# Patient Record
Sex: FEMALE | Race: WHITE | Employment: FULL TIME | URBAN - METROPOLITAN AREA
[De-identification: names, ages, dates, MRNs, and addresses within clinical notes are randomized per-mention and may not be internally consistent; named-entity substitution may affect disease eponyms.]

---

## 2020-12-08 PROBLEM — E11.9 TYPE 2 DIABETES MELLITUS WITHOUT COMPLICATION, WITHOUT LONG-TERM CURRENT USE OF INSULIN (HCC): Status: ACTIVE | Noted: 2020-12-08

## 2020-12-08 PROBLEM — I10 ESSENTIAL HYPERTENSION: Status: ACTIVE | Noted: 2020-12-08

## 2020-12-08 PROBLEM — K21.9 GASTROESOPHAGEAL REFLUX DISEASE WITHOUT ESOPHAGITIS: Status: ACTIVE | Noted: 2020-12-08

## 2022-12-06 ENCOUNTER — TELEPHONE (OUTPATIENT)
Dept: ORTHOPEDIC SURGERY | Age: 52
End: 2022-12-06

## 2022-12-06 NOTE — TELEPHONE ENCOUNTER
Patient is asking for a return call regarding her lab results from 11/28. Phone number on file has been verified. Thank you.

## 2022-12-14 NOTE — H&P (VIEW-ONLY)
History and Physical Service   [place] [address]    HISTORY AND PHYSICAL EXAMINATION            Date of Evaluation: 12/14/2022  Patient name:  Elmer Hui  MRN:   [id number]  YOB: 1970  PCP:    Sumeet Helton MD    History Obtained From:     Patient, medical records    History of Present Illness: This is Elmer Hui a 50 y.o. female who presents for a pre-admission testing appointment for an upcoming [address] & NEPHEW FOR SPACER by Sonya Funez DO scheduled on 1/03/2022 at 1350 due to Presence of left artificial knee joint [Z96.652]. The patient's chief complaint is 3/10 left knee pain, swelling , and burning which has progressively worsened over the past 3 months. Knee pain is aggravated by walking and is minimally relieved with ice and elevation. Prior treatment includes prior left total knee in 12/08/2020, as well as physical therapy and injections since initial surgery in 2020. Denies recent falls and injuries. She last saw Dr. Erma Whitman in office on 11/20/2022. Sleep apnea questionnaire  1) Do you snore loudly? Yes  2) Do you often feel tired, fatigued, or sleepy? No  3) Has anyone observed you stop breathing or choking/gasping during your sleep? No  4) Do you have hypertension? Yes  5) BMI >35 kg/m2? Yes  6) Age > 48? Yes  7) Pt is a male? No      Functional Capacity:  1) Pt is not able to walk 2 city blocks on level ground due to knee pain  2) Pt is not able to climb 2 flights of stairs due to knee pain  3) Pt is not able to walk up a hill for 1-2 city blocks due to knee pain    Prior to this, patient was very active walking miles at a time with no issue.     Past Medical History:     Past Medical History:   Diagnosis Date    Arthritis     Bladder spasms     Depression     Diabetes mellitus ([facility] [address]     pre-diabetic    Fracture left humerus/ Fall    GERD (gastroesophageal reflux disease)     Hypertension     Kidney stones     Pain     left humerus    Personal history of other medical treatment     Pt. denies ever being Diabetic, states she was put on Amaryl due to high A1C before a previous surgery.  Pt. states PCP will be taking her off this med at next visit    Seasonal allergies     Snores     denies apnea    Under care of team     urology/ Dr. Sheikh Self last seen 6-30-21    Urinary urgency     UTI (urinary tract infection)     frequent    Wears glasses     Wellness examination     PCP Carmella Palencia MD/arley/ last seen 7-9-21        Past Surgical History:     Past Surgical History:   Procedure Laterality Date    COLONOSCOPY      FOREARM SURGERY Left 10/1/2021    OPEN REDUCTION INTERNAL FIXATION PROXIMAL HUMERUS FRACTURE, SYNTHES, C-ARM performed by Anthony Mora DO at [facility] Left 10/01/2021    ORIF     HYSTERECTOMY (CERVIX STATUS UNKNOWN)  2016    KNEE ARTHROSCOPY Left 2016    KNEE ARTHROSCOPY Left 2/14/2020    LEFT KNEE ARTHROSCOPY WITH DEBRIDEMENT REMOVAL OF LOOSE BODY performed by Jeannine Price DO at [facility]  2005    SHOULDER ARTHROSCOPY Right 2010    SHOULDER SURGERY Left 10/01/2021    SHOULDER SURGERY Left 04/19/2022     LEFT SHOULDER IMPLANT REMOVAL AND MANIPULATION UNDER ANESTHESIA WITH SYNTHES SCREWDRIVERS AND PRE-OP INTERSCALENE BLOCK NO EXPAREL (Left Shoulder)    SHOULDER SURGERY Left 4/19/2022    LEFT SHOULDER IMPLANT REMOVAL AND MANIPULATION UNDER ANESTHESIA WITH SYNTHES SCREWDRIVERS AND PRE-OP INTERSCALENE BLOCK NO EXPAREL performed by Julio Cesar Fox MD at [address] [facility] [address]  12/2020    TOTAL KNEE ARTHROPLASTY Left 12/8/2020    LEFT KNEE TOTAL ARTHROPLASTY performed by Jeannine Price DO at [address] [facility]        Medications Prior to Admission:     Prior to Admission medications    Medication Sig Start Date End Date Taking? Authorizing Provider   calcium carbonate (OSCAL) 500 MG TABS tablet Take 500 mg by mouth daily With Vitamin D  Patient not taking: Reported on 12/14/2022    Historical Provider, MD   Multiple Vitamins-Minerals (HAIR SKIN AND NAILS FORMULA PO) Take 1 tablet by mouth daily    Historical Provider, MD   ibuprofen (ADVIL;MOTRIN) 800 MG tablet Take 800 mg by mouth every 6 hours as needed for Pain  Patient not taking: Reported on 12/14/2022    Historical Provider, MD   Mometasone Furoate (NASONEX NA) by Nasal route daily  Patient not taking: Reported on 12/14/2022    Historical Provider, MD   Turmeric (QC TUMERIC COMPLEX PO) Take by mouth daily  Patient not taking: Reported on 12/14/2022    Historical Provider, MD   polycarbophil (FIBERCON) 625 MG tablet Take 625 mg by mouth daily Unsure of dose  Patient not taking: Reported on 12/14/2022    Historical Provider, MD   citalopram (CELEXA) 40 MG tablet Take 40 mg by mouth daily 2/23/22   Historical Provider, MD   NONFORMULARY Take 1 tablet by mouth daily Cranberry extract  Patient not taking: Reported on 12/14/2022    Historical Provider, MD   glimepiride (AMARYL) 1 MG tablet Take 0.5 mg by mouth daily Takes 1/2 of a 1 mg tablet 11/23/20   Historical Provider, MD   loratadine (CLARITIN) 10 MG capsule Take 10 mg by mouth daily  Patient not taking: Reported on 12/14/2022    Historical Provider, MD   Ascorbic Acid (VITAMIN C PO) Take 250 mg by mouth nightly    Historical Provider, MD   omeprazole (PRILOSEC) 20 MG delayed release capsule Take 20 mg by mouth daily as needed     Historical Provider, MD   metoprolol tartrate (LOPRESSOR) 25 MG tablet Take 25 mg by mouth daily  11/4/19   Historical Provider, MD        Allergies:     Meperidine hcl and Sulfa antibiotics    Social History:     Tobacco:    reports that she has never smoked. She has never used smokeless tobacco.  Alcohol:      reports current alcohol use.   Drug Use:  reports no history of drug use.    Family History:     Family History   Problem Relation Age of Onset    No Known Problems Mother     No Known Problems Father        Review of Systems:     Positive and Negative as described in HPI. CONSTITUTIONAL: Negative for fevers, chills, sweats, fatigue, and weight loss. HEENT: Wears glasses Negative for hearing changes, rhinorrhea, and throat pain. RESPIRATORY: Snores. Negative for shortness of breath, cough, congestion, and wheezing. CARDIOVASCULAR: Hypertension Negative for chest pain, blood clot, irregular heartbeat, and palpitations. GASTROINTESTINAL: GERD. Negative for nausea, vomiting, diarrhea, constipation, change in bowel habits, and abdominal pain. GENITOURINARY: Urinary urgency, history of kidney stones, bladder spasms- follow with urology Dr. Mary Pradhan- next appointment second week of January. Total hysterectomy Negative for difficulty of urination, burning with urination, and frequency. INTEGUMENT: Negative for rash, skin lesions, and easy bruising. Instructed pt to call Dr. Erma Whitman as soon as possible if a rash or wound develops prior to surgery. Pt voiced understanding. HEMATOLOGIC/LYMPHATIC:  Negative for swelling/edema. ALLERGIC/IMMUNOLOGIC:  Negative for urticaria and itching. ENDOCRINE: Prediabetes. Negative for increase in thirst, increase in urination, and heat or cold intolerance. MUSCULOSKELETAL: See HPI. NEUROLOGICAL: Negative for headaches, dizziness, lightheadedness, numbness, and tingling extremities. BEHAVIOR/PSYCH: Depression. Negative for anxiety. Physical Exam:   Ht 5' 5\" (1.651 m)   Wt 241 lb (109.3 kg)   BMI 40.10 kg/m²   No LMP recorded. Patient has had a hysterectomy. No obstetric history on file. No results for input(s): POCGLU in the last 72 hours. General Appearance:  Alert, well appearing, and in no acute distress. Mental status: Oriented to person, place, and time. Head:  Normocephalic and atraumatic.   Eye: reading glasses No icterus, redness, pupils equal and reactive, extraocular eye movements intact, and conjunctiva clear. Ear:  Hearing grossly intact. Nose:  No drainage noted. Mouth:  Mucous membranes moist.  Neck:  Supple and no carotid bruits noted. Lungs: Bilateral equal air entry, clear to auscultation, no wheezing, rales or rhonchi, and normal effort. Cardiovascular: Normal rate, regular rhythm, no murmur, gallop, or rub. Abdomen: Morbidly obese, Soft, non-tender, non-distended, and active bowel sounds. Neurologic: Normal speech and cranial nerves II through XII grossly intact. Strength 5/5 bilaterally. Skin: No gross lesions, rashes, bruising, or bleeding on exposed skin area. Extremities:  Posterior tibial pulses 2+ bilaterally. No pedal edema. No calf tenderness with palpation. Psych: Normal affect.      Investigations:      Laboratory Testing:  Recent Results (from the past 24 hour(s))   EKG 12 Lead    Collection Time: 12/14/22  8:01 AM   Result Value Ref Range    Ventricular Rate 74 BPM    Atrial Rate 74 BPM    P-R Interval 140 ms    QRS Duration 88 ms    Q-T Interval 388 ms    QTc Calculation (Bazett) 430 ms    P Axis 26 degrees    R Axis 12 degrees    T Axis 23 degrees   CBC    Collection Time: 12/14/22  8:08 AM   Result Value Ref Range    WBC 10.5 3.5 - 11.3 k/uL    RBC 4.91 3.95 - 5.11 m/uL    Hemoglobin 13.4 11.9 - 15.1 g/dL    Hematocrit 43.7 36.3 - 47.1 %    MCV 89.0 82.6 - 102.9 fL    MCH 27.3 25.2 - 33.5 pg    MCHC 30.7 28.4 - 34.8 g/dL    RDW 12.9 11.8 - 14.4 %    Platelets 518 542 - 853 k/uL    MPV 10.3 8.1 - 13.5 fL    NRBC Automated 0.0 0.0 per 100 WBC   Comprehensive Metabolic Panel    Collection Time: 12/14/22  8:08 AM   Result Value Ref Range    Glucose 148 (H) 70 - 99 mg/dL    BUN 16 6 - 20 mg/dL    Creatinine 0.59 0.50 - 0.90 mg/dL    Est, Glom Filt Rate >60 >60 mL/min/1.73m2    Bun/Cre Ratio 27 (H) 9 - 20    Calcium 9.5 8.6 - 10.4 mg/dL    Sodium 139 135 - 144 mmol/L    Potassium 4.1 3.7 - 5.3 mmol/L Chloride 100 98 - 107 mmol/L    CO2 27 20 - 31 mmol/L    Anion Gap 12 9 - 17 mmol/L    Alkaline Phosphatase 104 35 - 104 U/L    ALT 16 5 - 33 U/L    AST 16 <32 U/L    Total Bilirubin 0.4 0.3 - 1.2 mg/dL    Total Protein 7.5 6.4 - 8.3 g/dL    Albumin 4.3 3.5 - 5.2 g/dL       Recent Labs     22  0808   HGB 13.4   HCT 43.7   WBC 10.5   MCV 89.0      K 4.1      CO2 27   BUN 16   CREATININE 0.59   GLUCOSE 148*   AST 16   ALT 16   LABALBU 4.3       No results for input(s): COVID19 in the last 720 hours. *Please note that labs listed above are the most recent lab values available in EPIC at the time of the visit and additional labs may have been drawn or resulted since that time. Imaging/Diagnostics:      No results found. EK2022. See Epic. Diagnosis:      1. Presence of left artificial knee joint [Z96.652]. Plans:     1.  LEFT KNEE TOTAL ARTHROPLASTY REVISION   WITH POSSIBLE CEMENT SPACER    WITH FROZEN SECTION      [facility]      Santy JAVI Betancourt - CNP  2022  8:44 AM

## 2022-12-30 ENCOUNTER — ANESTHESIA EVENT (OUTPATIENT)
Dept: OPERATING ROOM | Age: 52
DRG: 467 | End: 2022-12-30
Payer: COMMERCIAL

## 2023-01-03 ENCOUNTER — APPOINTMENT (OUTPATIENT)
Dept: GENERAL RADIOLOGY | Age: 53
DRG: 467 | End: 2023-01-03
Attending: ORTHOPAEDIC SURGERY
Payer: COMMERCIAL

## 2023-01-03 ENCOUNTER — HOSPITAL ENCOUNTER (INPATIENT)
Age: 53
LOS: 1 days | Discharge: HOME | DRG: 467 | End: 2023-01-04
Attending: ORTHOPAEDIC SURGERY | Admitting: ORTHOPAEDIC SURGERY
Payer: COMMERCIAL

## 2023-01-03 ENCOUNTER — ANESTHESIA (OUTPATIENT)
Dept: OPERATING ROOM | Age: 53
DRG: 467 | End: 2023-01-03
Payer: COMMERCIAL

## 2023-01-03 DIAGNOSIS — Z96.652 PRESENCE OF LEFT ARTIFICIAL KNEE JOINT: ICD-10-CM

## 2023-01-03 DIAGNOSIS — G89.18 POST-OP PAIN: Primary | ICD-10-CM

## 2023-01-03 LAB
GLUCOSE BLD-MCNC: 129 MG/DL (ref 65–105)
GLUCOSE BLD-MCNC: 241 MG/DL (ref 65–105)

## 2023-01-03 PROCEDURE — 7100000001 HC PACU RECOVERY - ADDTL 15 MIN: Performed by: ORTHOPAEDIC SURGERY

## 2023-01-03 PROCEDURE — 6360000002 HC RX W HCPCS

## 2023-01-03 PROCEDURE — 2580000003 HC RX 258: Performed by: ORTHOPAEDIC SURGERY

## 2023-01-03 PROCEDURE — 6370000000 HC RX 637 (ALT 250 FOR IP): Performed by: ANESTHESIOLOGY

## 2023-01-03 PROCEDURE — 88305 TISSUE EXAM BY PATHOLOGIST: CPT

## 2023-01-03 PROCEDURE — 3700000000 HC ANESTHESIA ATTENDED CARE: Performed by: ORTHOPAEDIC SURGERY

## 2023-01-03 PROCEDURE — 6360000002 HC RX W HCPCS: Performed by: ANESTHESIOLOGY

## 2023-01-03 PROCEDURE — 73562 X-RAY EXAM OF KNEE 3: CPT

## 2023-01-03 PROCEDURE — 2709999900 HC NON-CHARGEABLE SUPPLY: Performed by: ORTHOPAEDIC SURGERY

## 2023-01-03 PROCEDURE — 82947 ASSAY GLUCOSE BLOOD QUANT: CPT

## 2023-01-03 PROCEDURE — 2580000003 HC RX 258: Performed by: ANESTHESIOLOGY

## 2023-01-03 PROCEDURE — 87075 CULTR BACTERIA EXCEPT BLOOD: CPT

## 2023-01-03 PROCEDURE — 6360000002 HC RX W HCPCS: Performed by: SPECIALIST

## 2023-01-03 PROCEDURE — 7100000000 HC PACU RECOVERY - FIRST 15 MIN: Performed by: ORTHOPAEDIC SURGERY

## 2023-01-03 PROCEDURE — 3600000005 HC SURGERY LEVEL 5 BASE: Performed by: ORTHOPAEDIC SURGERY

## 2023-01-03 PROCEDURE — 87102 FUNGUS ISOLATION CULTURE: CPT

## 2023-01-03 PROCEDURE — 3700000001 HC ADD 15 MINUTES (ANESTHESIA): Performed by: ORTHOPAEDIC SURGERY

## 2023-01-03 PROCEDURE — 6360000002 HC RX W HCPCS: Performed by: ORTHOPAEDIC SURGERY

## 2023-01-03 PROCEDURE — 0SPD0JZ REMOVAL OF SYNTHETIC SUBSTITUTE FROM LEFT KNEE JOINT, OPEN APPROACH: ICD-10-PCS | Performed by: ORTHOPAEDIC SURGERY

## 2023-01-03 PROCEDURE — C9290 INJ, BUPIVACAINE LIPOSOME: HCPCS | Performed by: ANESTHESIOLOGY

## 2023-01-03 PROCEDURE — 64447 NJX AA&/STRD FEMORAL NRV IMG: CPT | Performed by: ANESTHESIOLOGY

## 2023-01-03 PROCEDURE — 6370000000 HC RX 637 (ALT 250 FOR IP)

## 2023-01-03 PROCEDURE — 87070 CULTURE OTHR SPECIMN AEROBIC: CPT

## 2023-01-03 PROCEDURE — 88331 PATH CONSLTJ SURG 1 BLK 1SPC: CPT

## 2023-01-03 PROCEDURE — 3600000015 HC SURGERY LEVEL 5 ADDTL 15MIN: Performed by: ORTHOPAEDIC SURGERY

## 2023-01-03 PROCEDURE — 87205 SMEAR GRAM STAIN: CPT

## 2023-01-03 PROCEDURE — 2500000003 HC RX 250 WO HCPCS: Performed by: ANESTHESIOLOGY

## 2023-01-03 PROCEDURE — C1713 ANCHOR/SCREW BN/BN,TIS/BN: HCPCS | Performed by: ORTHOPAEDIC SURGERY

## 2023-01-03 PROCEDURE — 2720000010 HC SURG SUPPLY STERILE: Performed by: ORTHOPAEDIC SURGERY

## 2023-01-03 PROCEDURE — 27487 REVISE/REPLACE KNEE JOINT: CPT | Performed by: ORTHOPAEDIC SURGERY

## 2023-01-03 PROCEDURE — C1776 JOINT DEVICE (IMPLANTABLE): HCPCS | Performed by: ORTHOPAEDIC SURGERY

## 2023-01-03 PROCEDURE — 2500000003 HC RX 250 WO HCPCS: Performed by: SPECIALIST

## 2023-01-03 PROCEDURE — 2580000003 HC RX 258

## 2023-01-03 PROCEDURE — 0SRD0J9 REPLACEMENT OF LEFT KNEE JOINT WITH SYNTHETIC SUBSTITUTE, CEMENTED, OPEN APPROACH: ICD-10-PCS | Performed by: ORTHOPAEDIC SURGERY

## 2023-01-03 PROCEDURE — 87176 TISSUE HOMOGENIZATION CULTR: CPT

## 2023-01-03 DEVICE — IMPLANTABLE DEVICE: Type: IMPLANTABLE DEVICE | Site: KNEE | Status: FUNCTIONAL

## 2023-01-03 DEVICE — CEMENT BNE 40GM HI VISC RADPQ FOR REV SURG: Type: IMPLANTABLE DEVICE | Site: KNEE | Status: FUNCTIONAL

## 2023-01-03 RX ORDER — GABAPENTIN 300 MG/1
300 CAPSULE ORAL ONCE
Status: COMPLETED | OUTPATIENT
Start: 2023-01-03 | End: 2023-01-03

## 2023-01-03 RX ORDER — SODIUM CHLORIDE 0.9 % (FLUSH) 0.9 %
5-40 SYRINGE (ML) INJECTION PRN
Status: DISCONTINUED | OUTPATIENT
Start: 2023-01-03 | End: 2023-01-03 | Stop reason: HOSPADM

## 2023-01-03 RX ORDER — HYDROMORPHONE HYDROCHLORIDE 1 MG/ML
0.5 INJECTION, SOLUTION INTRAMUSCULAR; INTRAVENOUS; SUBCUTANEOUS EVERY 5 MIN PRN
Status: DISCONTINUED | OUTPATIENT
Start: 2023-01-03 | End: 2023-01-03 | Stop reason: HOSPADM

## 2023-01-03 RX ORDER — FENTANYL CITRATE 50 UG/ML
INJECTION, SOLUTION INTRAMUSCULAR; INTRAVENOUS PRN
Status: DISCONTINUED | OUTPATIENT
Start: 2023-01-03 | End: 2023-01-03 | Stop reason: SDUPTHER

## 2023-01-03 RX ORDER — ONDANSETRON 2 MG/ML
4 INJECTION INTRAMUSCULAR; INTRAVENOUS EVERY 6 HOURS PRN
Status: DISCONTINUED | OUTPATIENT
Start: 2023-01-03 | End: 2023-01-04 | Stop reason: HOSPADM

## 2023-01-03 RX ORDER — ACETAMINOPHEN 500 MG
1000 TABLET ORAL ONCE
Status: COMPLETED | OUTPATIENT
Start: 2023-01-03 | End: 2023-01-03

## 2023-01-03 RX ORDER — ENOXAPARIN SODIUM 100 MG/ML
40 INJECTION SUBCUTANEOUS DAILY
Status: DISCONTINUED | OUTPATIENT
Start: 2023-01-03 | End: 2023-01-03 | Stop reason: DRUGHIGH

## 2023-01-03 RX ORDER — LIDOCAINE HYDROCHLORIDE 20 MG/ML
INJECTION, SOLUTION EPIDURAL; INFILTRATION; INTRACAUDAL; PERINEURAL PRN
Status: DISCONTINUED | OUTPATIENT
Start: 2023-01-03 | End: 2023-01-03 | Stop reason: SDUPTHER

## 2023-01-03 RX ORDER — SODIUM CHLORIDE 9 MG/ML
INJECTION, SOLUTION INTRAVENOUS PRN
Status: DISCONTINUED | OUTPATIENT
Start: 2023-01-03 | End: 2023-01-03 | Stop reason: HOSPADM

## 2023-01-03 RX ORDER — SODIUM CHLORIDE 9 MG/ML
INJECTION, SOLUTION INTRAVENOUS PRN
Status: DISCONTINUED | OUTPATIENT
Start: 2023-01-03 | End: 2023-01-04 | Stop reason: HOSPADM

## 2023-01-03 RX ORDER — ONDANSETRON 2 MG/ML
4 INJECTION INTRAMUSCULAR; INTRAVENOUS
Status: DISCONTINUED | OUTPATIENT
Start: 2023-01-03 | End: 2023-01-03 | Stop reason: HOSPADM

## 2023-01-03 RX ORDER — ASPIRIN 81 MG/1
81 TABLET ORAL 2 TIMES DAILY
Qty: 84 TABLET | Refills: 0 | Status: SHIPPED | OUTPATIENT
Start: 2023-01-03 | End: 2023-02-14

## 2023-01-03 RX ORDER — SODIUM CHLORIDE, SODIUM LACTATE, POTASSIUM CHLORIDE, CALCIUM CHLORIDE 600; 310; 30; 20 MG/100ML; MG/100ML; MG/100ML; MG/100ML
INJECTION, SOLUTION INTRAVENOUS CONTINUOUS
Status: DISCONTINUED | OUTPATIENT
Start: 2023-01-03 | End: 2023-01-04 | Stop reason: HOSPADM

## 2023-01-03 RX ORDER — LIDOCAINE HYDROCHLORIDE 10 MG/ML
INJECTION, SOLUTION INFILTRATION; PERINEURAL PRN
Status: DISCONTINUED | OUTPATIENT
Start: 2023-01-03 | End: 2023-01-03 | Stop reason: SDUPTHER

## 2023-01-03 RX ORDER — PROPOFOL 10 MG/ML
INJECTION, EMULSION INTRAVENOUS CONTINUOUS PRN
Status: DISCONTINUED | OUTPATIENT
Start: 2023-01-03 | End: 2023-01-03 | Stop reason: SDUPTHER

## 2023-01-03 RX ORDER — ACETAMINOPHEN 500 MG
1000 TABLET ORAL EVERY 6 HOURS
Status: DISCONTINUED | OUTPATIENT
Start: 2023-01-03 | End: 2023-01-04 | Stop reason: HOSPADM

## 2023-01-03 RX ORDER — OXYCODONE HYDROCHLORIDE AND ACETAMINOPHEN 5; 325 MG/1; MG/1
1-2 TABLET ORAL EVERY 6 HOURS PRN
Qty: 56 TABLET | Refills: 0 | Status: SHIPPED | OUTPATIENT
Start: 2023-01-03 | End: 2023-01-10

## 2023-01-03 RX ORDER — ACETAMINOPHEN 325 MG/1
650 TABLET ORAL EVERY 6 HOURS
Status: DISCONTINUED | OUTPATIENT
Start: 2023-01-03 | End: 2023-01-03 | Stop reason: DRUGHIGH

## 2023-01-03 RX ORDER — SODIUM CHLORIDE 0.9 % (FLUSH) 0.9 %
5-40 SYRINGE (ML) INJECTION EVERY 12 HOURS SCHEDULED
Status: DISCONTINUED | OUTPATIENT
Start: 2023-01-03 | End: 2023-01-03 | Stop reason: HOSPADM

## 2023-01-03 RX ORDER — OXYCODONE HYDROCHLORIDE 5 MG/1
10 TABLET ORAL EVERY 4 HOURS PRN
Status: DISCONTINUED | OUTPATIENT
Start: 2023-01-03 | End: 2023-01-04 | Stop reason: HOSPADM

## 2023-01-03 RX ORDER — KETOROLAC TROMETHAMINE 15 MG/ML
15 INJECTION, SOLUTION INTRAMUSCULAR; INTRAVENOUS EVERY 6 HOURS PRN
Status: DISCONTINUED | OUTPATIENT
Start: 2023-01-03 | End: 2023-01-04 | Stop reason: HOSPADM

## 2023-01-03 RX ORDER — FENTANYL CITRATE 50 UG/ML
25 INJECTION, SOLUTION INTRAMUSCULAR; INTRAVENOUS EVERY 5 MIN PRN
Status: COMPLETED | OUTPATIENT
Start: 2023-01-03 | End: 2023-01-03

## 2023-01-03 RX ORDER — SODIUM CHLORIDE 0.9 % (FLUSH) 0.9 %
5-40 SYRINGE (ML) INJECTION PRN
Status: DISCONTINUED | OUTPATIENT
Start: 2023-01-03 | End: 2023-01-04 | Stop reason: HOSPADM

## 2023-01-03 RX ORDER — ENOXAPARIN SODIUM 100 MG/ML
30 INJECTION SUBCUTANEOUS 2 TIMES DAILY
Status: DISCONTINUED | OUTPATIENT
Start: 2023-01-03 | End: 2023-01-04 | Stop reason: HOSPADM

## 2023-01-03 RX ORDER — CELECOXIB 200 MG/1
200 CAPSULE ORAL 2 TIMES DAILY
Status: DISCONTINUED | OUTPATIENT
Start: 2023-01-03 | End: 2023-01-04 | Stop reason: HOSPADM

## 2023-01-03 RX ORDER — ONDANSETRON 4 MG/1
4 TABLET, ORALLY DISINTEGRATING ORAL EVERY 8 HOURS PRN
Status: DISCONTINUED | OUTPATIENT
Start: 2023-01-03 | End: 2023-01-04 | Stop reason: HOSPADM

## 2023-01-03 RX ORDER — VANCOMYCIN HYDROCHLORIDE 1 G/20ML
INJECTION, POWDER, LYOPHILIZED, FOR SOLUTION INTRAVENOUS
Status: DISPENSED
Start: 2023-01-03 | End: 2023-01-04

## 2023-01-03 RX ORDER — MIDAZOLAM HYDROCHLORIDE 1 MG/ML
2 INJECTION INTRAMUSCULAR; INTRAVENOUS ONCE
Status: COMPLETED | OUTPATIENT
Start: 2023-01-03 | End: 2023-01-03

## 2023-01-03 RX ORDER — DIAZEPAM 5 MG/1
5 TABLET ORAL EVERY 6 HOURS PRN
Status: DISCONTINUED | OUTPATIENT
Start: 2023-01-03 | End: 2023-01-04 | Stop reason: HOSPADM

## 2023-01-03 RX ORDER — VANCOMYCIN HYDROCHLORIDE 1 G/20ML
INJECTION, POWDER, LYOPHILIZED, FOR SOLUTION INTRAVENOUS PRN
Status: DISCONTINUED | OUTPATIENT
Start: 2023-01-03 | End: 2023-01-03 | Stop reason: ALTCHOICE

## 2023-01-03 RX ORDER — SODIUM CHLORIDE 0.9 % (FLUSH) 0.9 %
5-40 SYRINGE (ML) INJECTION EVERY 12 HOURS SCHEDULED
Status: DISCONTINUED | OUTPATIENT
Start: 2023-01-03 | End: 2023-01-04 | Stop reason: HOSPADM

## 2023-01-03 RX ORDER — DOCUSATE SODIUM 100 MG/1
100 CAPSULE, LIQUID FILLED ORAL 2 TIMES DAILY PRN
Qty: 60 CAPSULE | Refills: 0 | Status: SHIPPED | OUTPATIENT
Start: 2023-01-03

## 2023-01-03 RX ORDER — BUPIVACAINE HYDROCHLORIDE 7.5 MG/ML
INJECTION, SOLUTION INTRASPINAL PRN
Status: DISCONTINUED | OUTPATIENT
Start: 2023-01-03 | End: 2023-01-03 | Stop reason: SDUPTHER

## 2023-01-03 RX ORDER — TRAMADOL HYDROCHLORIDE 50 MG/1
50 TABLET ORAL EVERY 6 HOURS PRN
Status: DISCONTINUED | OUTPATIENT
Start: 2023-01-03 | End: 2023-01-04 | Stop reason: HOSPADM

## 2023-01-03 RX ORDER — PREGABALIN 75 MG/1
75 CAPSULE ORAL 2 TIMES DAILY
Status: DISCONTINUED | OUTPATIENT
Start: 2023-01-03 | End: 2023-01-04 | Stop reason: HOSPADM

## 2023-01-03 RX ORDER — BUPIVACAINE HYDROCHLORIDE 2.5 MG/ML
INJECTION, SOLUTION EPIDURAL; INFILTRATION; INTRACAUDAL PRN
Status: DISCONTINUED | OUTPATIENT
Start: 2023-01-03 | End: 2023-01-03 | Stop reason: SDUPTHER

## 2023-01-03 RX ORDER — TRANEXAMIC ACID 100 MG/ML
INJECTION, SOLUTION INTRAVENOUS PRN
Status: DISCONTINUED | OUTPATIENT
Start: 2023-01-03 | End: 2023-01-03 | Stop reason: SDUPTHER

## 2023-01-03 RX ORDER — LIDOCAINE HYDROCHLORIDE 10 MG/ML
1 INJECTION, SOLUTION EPIDURAL; INFILTRATION; INTRACAUDAL; PERINEURAL
Status: DISCONTINUED | OUTPATIENT
Start: 2023-01-04 | End: 2023-01-03 | Stop reason: HOSPADM

## 2023-01-03 RX ORDER — OXYCODONE HYDROCHLORIDE 5 MG/1
5 TABLET ORAL EVERY 4 HOURS PRN
Status: DISCONTINUED | OUTPATIENT
Start: 2023-01-03 | End: 2023-01-04 | Stop reason: HOSPADM

## 2023-01-03 RX ADMIN — PREGABALIN 75 MG: 75 CAPSULE ORAL at 20:34

## 2023-01-03 RX ADMIN — BUPIVACAINE HYDROCHLORIDE IN DEXTROSE 1.7 ML: 7.5 INJECTION, SOLUTION SUBARACHNOID at 14:20

## 2023-01-03 RX ADMIN — SODIUM CHLORIDE, PRESERVATIVE FREE 10 ML: 5 INJECTION INTRAVENOUS at 20:35

## 2023-01-03 RX ADMIN — DIAZEPAM 5 MG: 5 TABLET ORAL at 20:34

## 2023-01-03 RX ADMIN — FENTANYL CITRATE 25 MCG: 50 INJECTION, SOLUTION INTRAMUSCULAR; INTRAVENOUS at 17:44

## 2023-01-03 RX ADMIN — FENTANYL CITRATE 25 MCG: 50 INJECTION, SOLUTION INTRAMUSCULAR; INTRAVENOUS at 17:15

## 2023-01-03 RX ADMIN — OXYCODONE 10 MG: 5 TABLET ORAL at 17:45

## 2023-01-03 RX ADMIN — CELECOXIB 200 MG: 200 CAPSULE ORAL at 20:34

## 2023-01-03 RX ADMIN — BUPIVACAINE 10 ML: 13.3 INJECTION, SUSPENSION, LIPOSOMAL INFILTRATION at 13:35

## 2023-01-03 RX ADMIN — Medication 50 MCG: at 14:22

## 2023-01-03 RX ADMIN — TRANEXAMIC ACID 1000 MG: 100 INJECTION, SOLUTION INTRAVENOUS at 14:33

## 2023-01-03 RX ADMIN — ACETAMINOPHEN 1000 MG: 500 TABLET, FILM COATED ORAL at 12:33

## 2023-01-03 RX ADMIN — Medication 50 MCG: at 14:16

## 2023-01-03 RX ADMIN — ENOXAPARIN SODIUM 30 MG: 100 INJECTION SUBCUTANEOUS at 20:34

## 2023-01-03 RX ADMIN — VANCOMYCIN HYDROCHLORIDE 1500 MG: 5 INJECTION, POWDER, LYOPHILIZED, FOR SOLUTION INTRAVENOUS at 14:24

## 2023-01-03 RX ADMIN — LIDOCAINE HYDROCHLORIDE 80 MG: 20 INJECTION, SOLUTION EPIDURAL; INFILTRATION; INTRACAUDAL; PERINEURAL at 14:21

## 2023-01-03 RX ADMIN — FENTANYL CITRATE 25 MCG: 50 INJECTION, SOLUTION INTRAMUSCULAR; INTRAVENOUS at 17:23

## 2023-01-03 RX ADMIN — MIDAZOLAM 2 MG: 1 INJECTION INTRAMUSCULAR; INTRAVENOUS at 13:30

## 2023-01-03 RX ADMIN — PROPOFOL 100 MCG/KG/MIN: 10 INJECTION, EMULSION INTRAVENOUS at 14:21

## 2023-01-03 RX ADMIN — KETOROLAC TROMETHAMINE 15 MG: 15 INJECTION, SOLUTION INTRAMUSCULAR; INTRAVENOUS at 18:23

## 2023-01-03 RX ADMIN — BUPIVACAINE HYDROCHLORIDE 20 ML: 2.5 INJECTION, SOLUTION EPIDURAL; INFILTRATION; INTRACAUDAL; PERINEURAL at 13:35

## 2023-01-03 RX ADMIN — FENTANYL CITRATE 25 MCG: 50 INJECTION, SOLUTION INTRAMUSCULAR; INTRAVENOUS at 17:49

## 2023-01-03 RX ADMIN — GABAPENTIN 300 MG: 300 CAPSULE ORAL at 12:33

## 2023-01-03 RX ADMIN — ACETAMINOPHEN 1000 MG: 500 TABLET, FILM COATED ORAL at 18:24

## 2023-01-03 RX ADMIN — LIDOCAINE HYDROCHLORIDE 3 ML: 10 INJECTION, SOLUTION INFILTRATION; PERINEURAL at 14:19

## 2023-01-03 RX ADMIN — TRANEXAMIC ACID 1000 MG: 100 INJECTION, SOLUTION INTRAVENOUS at 16:09

## 2023-01-03 RX ADMIN — LIDOCAINE HYDROCHLORIDE 3 ML: 10 INJECTION, SOLUTION INFILTRATION; PERINEURAL at 13:35

## 2023-01-03 RX ADMIN — OXYCODONE 10 MG: 5 TABLET ORAL at 22:16

## 2023-01-03 RX ADMIN — SODIUM CHLORIDE, POTASSIUM CHLORIDE, SODIUM LACTATE AND CALCIUM CHLORIDE: 600; 310; 30; 20 INJECTION, SOLUTION INTRAVENOUS at 12:32

## 2023-01-03 ASSESSMENT — PAIN DESCRIPTION - ONSET: ONSET: ON-GOING

## 2023-01-03 ASSESSMENT — PAIN DESCRIPTION - DESCRIPTORS
DESCRIPTORS: THROBBING;ACHING
DESCRIPTORS: ACHING
DESCRIPTORS: ACHING
DESCRIPTORS: ACHING;DISCOMFORT
DESCRIPTORS: ACHING
DESCRIPTORS: BURNING;ACHING

## 2023-01-03 ASSESSMENT — PAIN DESCRIPTION - LOCATION
LOCATION: KNEE

## 2023-01-03 ASSESSMENT — PAIN SCALES - GENERAL
PAINLEVEL_OUTOF10: 5
PAINLEVEL_OUTOF10: 6
PAINLEVEL_OUTOF10: 7
PAINLEVEL_OUTOF10: 9
PAINLEVEL_OUTOF10: 6
PAINLEVEL_OUTOF10: 7

## 2023-01-03 ASSESSMENT — PAIN - FUNCTIONAL ASSESSMENT
PAIN_FUNCTIONAL_ASSESSMENT: ACTIVITIES ARE NOT PREVENTED
PAIN_FUNCTIONAL_ASSESSMENT: 0-10
PAIN_FUNCTIONAL_ASSESSMENT: 0-10

## 2023-01-03 ASSESSMENT — PAIN DESCRIPTION - FREQUENCY: FREQUENCY: INTERMITTENT

## 2023-01-03 ASSESSMENT — PAIN DESCRIPTION - PAIN TYPE: TYPE: SURGICAL PAIN

## 2023-01-03 ASSESSMENT — PAIN DESCRIPTION - ORIENTATION
ORIENTATION: LEFT

## 2023-01-03 NOTE — ANESTHESIA POSTPROCEDURE EVALUATION
Department of Anesthesiology  Postprocedure Note    Patient: Shay Valderrama  MRN: [id number]  YOB: 1970  Date of evaluation: 1/3/2023      Procedure Summary     Date: 01/03/23 Room / Location: [address] - INPATIENT    Anesthesia Start: 1821 Anesthesia Stop: 1632    Procedure: LEFT KNEE TOTAL ARTHROPLASTY REVISION       WITH FROZEN SECTION      PRP PLATELET GEL (Left: Knee) Diagnosis:       Presence of left artificial knee joint      (Presence of left artificial knee joint [Z96.652])    Surgeons: Jed Goins DO Responsible Provider: Casper Olson DO    Anesthesia Type: spinal ASA Status: 3          Anesthesia Type: No value filed.     Lety Phase I:      Lety Phase II:        Anesthesia Post Evaluation    Patient location during evaluation: PACU  Patient participation: complete - patient participated  Level of consciousness: awake and alert  Airway patency: patent  Nausea & Vomiting: no nausea and no vomiting  Complications: no  Cardiovascular status: hemodynamically stable  Respiratory status: acceptable  Hydration status: stable

## 2023-01-03 NOTE — PROGRESS NOTES
Physical Therapy        Physical Therapy Cancel Note      DATE: 1/3/2023    NAME: Sridhar Herring  MRN: [id number]   : 1970      Patient not seen this date for Physical Therapy due to:    Recent PACU admit from surgery; Pt. Is also a planned overnight stay.   Will check in AM.      Electronically signed by Omayra Tolbert PT on 1/3/2023 at 5:35 PM

## 2023-01-03 NOTE — ANESTHESIA PROCEDURE NOTES
Spinal Block    Patient location during procedure: OR  End time: 1/3/2023 2:22 PM  Reason for block: primary anesthetic  Staffing  Performed: resident/CRNA   Anesthesiologist: Luba Nielson DO  Resident/CRNA: JAVI Leong CRNA  Spinal Block  Patient position: sitting  Prep: Betadine  Patient monitoring: continuous pulse ox and frequent blood pressure checks  Approach: midline  Location: L3/L4  Guidance: paresthesia technique  Provider prep: mask and sterile gloves  Needle  Needle type: Pencan   Needle gauge: 24 G  Needle length: 3.5 in  Assessment  Sensory level: T8  Swirl obtained: Yes  CSF: clear  Attempts: 1  Hemodynamics: stable  Preanesthetic Checklist  Completed: patient identified, IV checked, site marked, risks and benefits discussed, surgical/procedural consents, equipment checked, pre-op evaluation, timeout performed, anesthesia consent given, oxygen available, monitors applied/VS acknowledged, fire risk safety assessment completed and verbalized and blood product R/B/A discussed and consented

## 2023-01-03 NOTE — ANESTHESIA PROCEDURE NOTES
Peripheral Block    Patient location during procedure: pre-op  Reason for block: post-op pain management and at surgeon's request  Start time: 1/3/2023 1:29 PM  End time: 1/3/2023 1:37 PM  Staffing  Performed: anesthesiologist   Anesthesiologist: Blanche Martínez DO  Preanesthetic Checklist  Completed: patient identified, IV checked, site marked, risks and benefits discussed, surgical/procedural consents, equipment checked, pre-op evaluation, timeout performed, anesthesia consent given, oxygen available and monitors applied/VS acknowledged  Peripheral Block   Patient position: supine  Prep: ChloraPrep  Provider prep: mask and sterile gloves  Patient monitoring: cardiac monitor, continuous pulse ox, frequent blood pressure checks and IV access  Block type: Femoral  Adductor canal  Laterality: left  Injection technique: single-shot  Guidance: ultrasound guided  Local infiltration: lidocaine  Infiltration strength: 1 %  Local infiltration: lidocaine  Dose: 3 mL    Needle   Needle type: insulated echogenic nerve stimulator needle   Needle gauge: 21 G  Needle localization: ultrasound guidance  Needle length: 10 cm  Assessment   Injection assessment: negative aspiration for heme, no paresthesia on injection and local visualized surrounding nerve on ultrasound  Paresthesia pain: none  Slow fractionated injection: yes  Hemodynamics: stable  Real-time US image taken/store: yes    Additional Notes  Left adductor canal single shot   20ml 0.25% bupivacaine + 10ml 1.3% exparel

## 2023-01-03 NOTE — ANESTHESIA PRE PROCEDURE
Department of Anesthesiology  Preprocedure Note       Name:  Aldo Briones   Age:  50 y.o.  :  1970                                          MRN:  [id number]         Date:  1/3/2023      Surgeon: Jessie Mohs):  Demetria Mckee DO    Procedure: Procedure(s):  LEFT KNEE TOTAL ARTHROPLASTY REVISION   WITH POSSIBLE CEMENT SPACER    WITH FROZEN SECTION      MARGAUX   METAL HYPER-SENSITIVITY      SMITH & NEPHEW FOR SPACER             - PT WANTS PRP PLATELET GEL    Medications prior to admission:   Prior to Admission medications    Medication Sig Start Date End Date Taking? Authorizing Provider   Multiple Vitamins-Minerals (HAIR SKIN AND NAILS FORMULA PO) Take 1 tablet by mouth daily    Historical Provider, MD   citalopram (CELEXA) 40 MG tablet Take 40 mg by mouth daily 22   Historical Provider, MD   glimepiride (AMARYL) 1 MG tablet Take 0.5 mg by mouth daily Takes 1/2 of a 1 mg tablet 20   Historical Provider, MD   Ascorbic Acid (VITAMIN C PO) Take 250 mg by mouth nightly    Historical Provider, MD   omeprazole (PRILOSEC) 20 MG delayed release capsule Take 20 mg by mouth daily as needed     Historical Provider, MD   metoprolol tartrate (LOPRESSOR) 25 MG tablet Take 25 mg by mouth daily  19   Historical Provider, MD       Current medications:    No current facility-administered medications for this visit. No current outpatient medications on file.      Facility-Administered Medications Ordered in Other Visits   Medication Dose Route Frequency Provider Last Rate Last Admin    [START ON 2023] lidocaine PF 1 % injection 1 mL  1 mL IntraDERmal Once PRN Valetta Ambrosia, DO        sodium chloride flush 0.9 % injection 5-40 mL  5-40 mL IntraVENous 2 times per day Fadi Bonilla, DO        sodium chloride flush 0.9 % injection 5-40 mL  5-40 mL IntraVENous PRN Valetta Ambrosia, DO        0.9 % sodium chloride infusion   IntraVENous PRN Valetta Ambrosia, DO        vancomycin (VANCOCIN) 1,500 mg in dextrose 5 % 250 mL IVPB  1,500 mg IntraVENous Once [place], DO        lactated ringers infusion   IntraVENous Continuous Ronne Tricia,  mL/hr at 01/03/23 1232 New Bag at 01/03/23 1232    bupivacaine liposome (EXPAREL) 1.3 % injection 133 mg  10 mL SubCUTAneous Once Ronne Tricia, DO           Allergies: Allergies   Allergen Reactions    Morphine Sulfate [Morphine] Itching    Meperidine Hcl Other (See Comments)     Pt. Doesn't remember reaction, maybe itching    Sulfa Antibiotics Itching       Problem List:    Patient Active Problem List   Diagnosis Code    S/P left knee arthroscopy Z98.890    History of total knee arthroplasty, left O24.697    Type 2 diabetes mellitus without complication, without long-term current use of insulin ([facility]) E11.9    Essential hypertension I10    Gastroesophageal reflux disease without esophagitis K21.9    Closed fracture of left proximal humerus S42.202A       Past Medical History:        Diagnosis Date    Arthritis     Bladder spasms     Depression     Diabetes mellitus ([facility] [address]     pre-diabetic    Fracture     left humerus/ Fall    GERD (gastroesophageal reflux disease)     Hypertension     Kidney stones     Pain     left humerus    Personal history of other medical treatment     Pt. denies ever being Diabetic, states she was put on Amaryl due to high A1C before a previous surgery.  Pt. states PCP will be taking her off this med at next visit    Seasonal allergies     Snores     denies apnea    Under care of team     urology/ Dr. Fredis Garrido last seen 6-30-21    Urinary urgency     UTI (urinary tract infection)     frequent    Wears glasses     Wellness examination     PCP Patria Steven MD/arley/ last seen 7-9-21       Past Surgical History:        Procedure Laterality Date    COLONOSCOPY      FOREARM SURGERY Left 10/1/2021    OPEN REDUCTION INTERNAL FIXATION PROXIMAL HUMERUS FRACTURE, SYNTHES, C-ARM performed by Crescencio Nicholson DO at [facility] OR   • HUMERUS FRACTURE SURGERY Left 10/01/2021    ORIF    • HYSTERECTOMY (CERVIX STATUS UNKNOWN)  2016   • KNEE ARTHROSCOPY Left 2016   • KNEE ARTHROSCOPY Left 2/14/2020    LEFT KNEE ARTHROSCOPY WITH DEBRIDEMENT REMOVAL OF LOOSE BODY performed by Dwight Glass DO at [facility] OR   • LASIK Bilateral 1998   • NASAL SEPTUM SURGERY  2005   • SHOULDER ARTHROSCOPY Right 2010   • SHOULDER SURGERY Left 10/01/2021   • SHOULDER SURGERY Left 04/19/2022     LEFT SHOULDER IMPLANT REMOVAL AND MANIPULATION UNDER ANESTHESIA WITH SYNTHES SCREWDRIVERS AND PRE-OP INTERSCALENE BLOCK NO EXPAREL (Left Shoulder)   • SHOULDER SURGERY Left 4/19/2022    LEFT SHOULDER IMPLANT REMOVAL AND MANIPULATION UNDER ANESTHESIA WITH SYNTHES SCREWDRIVERS AND PRE-OP INTERSCALENE BLOCK NO EXPAREL performed by Ernesto William MD at [facility]   • TONSILLECTOMY     • TOOTH EXTRACTION  12/2020   • TOTAL KNEE ARTHROPLASTY Left 12/8/2020    LEFT KNEE TOTAL ARTHROPLASTY performed by Dwight Glass DO at [facility] OR       Social History:    Social History     Tobacco Use   • Smoking status: Never   • Smokeless tobacco: Never   Substance Use Topics   • Alcohol use: Yes     Comment: rare                                Counseling given: Not Answered      Vital Signs (Current):   There were no vitals filed for this visit.                                           BP Readings from Last 3 Encounters:   01/03/23 113/68   05/13/22 120/77   04/19/22 (!) 139/96       NPO Status:                                                                                 BMI:   Wt Readings from Last 3 Encounters:   01/03/23 241 lb (109.3 kg)   12/14/22 241 lb (109.3 kg)   11/17/22 245 lb (111.1 kg)     There is no height or weight on file to calculate BMI.    CBC:   Lab Results   Component Value Date/Time    WBC 10.5 12/14/2022 08:08 AM    RBC 4.91 12/14/2022 08:08 AM    HGB 13.4 12/14/2022 08:08 AM    HCT 43.7 12/14/2022 08:08 AM    MCV 89.0  12/14/2022 08:08 AM    RDW 12.9 12/14/2022 08:08 AM     12/14/2022 08:08 AM       CMP:   Lab Results   Component Value Date/Time     12/14/2022 08:08 AM    K 4.1 12/14/2022 08:08 AM     12/14/2022 08:08 AM    CO2 27 12/14/2022 08:08 AM    BUN 16 12/14/2022 08:08 AM    CREATININE 0.59 12/14/2022 08:08 AM    GFRAA >60 04/04/2022 12:01 PM    LABGLOM >60 12/14/2022 08:08 AM    GLUCOSE 148 12/14/2022 08:08 AM    PROT 7.5 12/14/2022 08:08 AM    CALCIUM 9.5 12/14/2022 08:08 AM    BILITOT 0.4 12/14/2022 08:08 AM    ALKPHOS 104 12/14/2022 08:08 AM    AST 16 12/14/2022 08:08 AM    ALT 16 12/14/2022 08:08 AM       POC Tests:   Recent Labs     01/03/23  1231   POCGLU 129*       Coags: No results found for: PROTIME, INR, APTT    HCG (If Applicable): No results found for: PREGTESTUR, PREGSERUM, HCG, HCGQUANT     ABGs: No results found for: PHART, PO2ART, YPZ9TVU, RXP0DSM, BEART, S2FOWCYQ     Type & Screen (If Applicable):  No results found for: LABABO, LABRH    Drug/Infectious Status (If Applicable):  No results found for: HIV, HEPCAB    COVID-19 Screening (If Applicable):   Lab Results   Component Value Date/Time    COVID19 Not Detected 12/04/2020 03:00 PM           Anesthesia Evaluation  Patient summary reviewed and Nursing notes reviewed no history of anesthetic complications:   Airway: Mallampati: II  TM distance: >3 FB   Neck ROM: full  Mouth opening: > = 3 FB   Dental: normal exam     Comment: -MISSING ONE LOWER RIGHT TOOTH    Pulmonary:normal exam    (+) sleep apnea: on noncompliant,                             Cardiovascular:  Exercise tolerance: good (>4 METS),   (+) hypertension:,       ECG reviewed                     ROS comment: -EKG - SR @ 68     Neuro/Psych:   Negative Neuro/Psych ROS  (+) psychiatric history:            GI/Hepatic/Renal:   (+) GERD: well controlled, morbid obesity          Endo/Other:    (+) : arthritis:., .                  ROS comment: -NPO AFTER MIDNIGHT  -ALLERGIES - DEMEROL, SULFA Abdominal:             Vascular: negative vascular ROS. Other Findings:             Anesthesia Plan      spinal     ASA 3       Induction: intravenous. MIPS: Postoperative opioids intended and Prophylactic antiemetics administered. Anesthetic plan and risks discussed with patient. Plan discussed with CRNA.     Attending anesthesiologist reviewed and agrees with Preprocedure content                Isma Anaya DO   1/3/2023

## 2023-01-03 NOTE — PROGRESS NOTES
Nerve block completed per Dr Noble Settler and pt tolerated well. Pt and pt family updated on plan of care and all questions answered and support provided. Pt resting in bed with monitor in place and call light within reach. Will monitor.

## 2023-01-03 NOTE — PLAN OF CARE
PROTOCOLS  NURSING IMPLEMENTED    TOTAL JOINT DVT/PE  VENOUS THROMBOEMBOLISM PROPHYLAXIS  (Nursing Automatically Implement)    Jason Leonardo  [id number]  @DB@  1/2/23    YES DVT RISK FACTOR SCORE YES MAJOR BLEEDING RISK FACTORS SCORE     [x] 48years old or greater (1)   [] Hx. Easy Bleeding (1)      [] Heart failure (2)   [] NSAID Use in Last 5 Days (2)      [] Varicose veins - Hx. (1)   [] Gastrointestinal or Genitourinary bleeding in Last 14 Days (2)      [] Myocardial Infarction - Hx. (1)         [] Cancer - Hx. (2)         [] Atrial fibrillation - Hx. (1)         [] Ischemic Stroke - Hx. (1)         [x] Diabetes Mellitus - Hx. (1)         [] Previous DVT/PE - Hx.  (2)         [] Hormone Replacement Therapy (1)         [] Obesity (1)         [] Paralysis (1)         [] Pregnancy (1)         [] Smoking (1)                   [] Thromophilia (1)   []   Mild to Moderate Bleeding (2)      [] Total Hip Arthroplasty (1)   [] Active Bleeding (4)      [] Family history of PE or DVT? (4) (Consider the following labs to test for presence of inhibitor deficiency state:) Factor V Leiden, Prothrombin Gene Mutation, Protein S Deficiency, Protien C Deficiency, Antithrombin Deficiency   [] Malignant Hypertension (2)        [] Thrombocytopenia 20k to 100k (2)        [] Thrombocytopenia less than 20k (4)        [] Bleeding Diathesis (4)        [] \"Bloody Stick\" Epidural or Spinal (2)     TOTAL DVT SCORE   TOTAL BLEEDING SCORE      [x] CLASS A   Standard Risk DVT (0-3)    [x] CLASS X Standard Risk Bleeding (0-4)      [] CLASS B Elevated Risk DVT (greater than 3)    [] CLASS Y High Risk Bleeding (greater than 4)     FINAL MATRIX (e.g. AY)       *If allergic to ASA use Warfarin  *BY patient consider no treatment  **Consider venous filter with high risk PE  **If on Coumadin pre-op, then restart night of surgery      [x]  DVT Prophylaxis: Class AX, AY    Ecotrin 81 mg by mouth BID starting day of surgery for 6 weeks for all total joints. (If allergic to aspirin, give eliquis 2.5mg BID X 14 days (knees) or 35 days  (hips) starting first day postop at 0600). []  DVT Prophylaxis:  Class BX (khurram choice)    []Eliquis 2.5mg BID x 14 days (knees) or 35 days (hips) starting first day postop      at 0600      [] Lovenox 40 mg subcu daily starting first day postop at 0600 x 14 days                             (knees) or 35 days (hips)    Ecotrin 81 mg PO BID-start when Lovenox is finished. (Teach injection prior to discharge.)       [] Xarelto 10 mg by mouth daily starting first day postop at 0600     14 days (knees) or 35 days (hips). If creatinine clearance less than 30 mL/min, give Lovenox 30 mg subcu   Daily starting first day postop at 0600. Ecotrin 81 mg PO BID-starting   when Lovenox is finished. (Teach injection prior to      discharge.)  (For discharge fill throughout the 10 mg daily with no    refills.)      []  DVT Prophylaxis:  Class BY (khurram choice)               []  Eliquis 2.5mg BID x 14 days (knees) or 35 days (hips) starting first day                              postop at 0600        []  Ecotrin 81 mg PO BID x 6weeks (all joints)      []  Lovenox 40mg SQ daily to start at 0600 first day post-Op day. 14 days for knees, 35 days for hips    Ecotrin 81 mg PO BID - start when Lovenox is finished. (Teach injection prior to discharge.)       [] Xarelto 10 mg PO daily starting first day Post-Op at 0600    14 days (knees) or 35 days (hips)    If Creatinine Clearance less than 30ml/min, give Lovenox 30 mg    SQ daily starting first day Post-Op at 0600 x 14 days (knees) or 35   days (hips). Ecotrin 81 mg PO BID - start when Lovenox is finished.     (Teach injection prior to discharge.)  (For discharge fill throughout the   10 mg daily #32 with no refills.)      Electronically signed by Amber Montilla DO on 1/2/2023 at 8:18 PM

## 2023-01-03 NOTE — BRIEF OP NOTE
Brief Postoperative Note      Patient: Cash Lala  YOB: 1970  MRN: [id number]    Date of Procedure: 1/3/2023    Pre-Op Diagnosis: Presence of left artificial knee joint [Z96.652]    Post-Op Diagnosis: Left total knee metal hypersensitivity reaction       Procedure(s):  LEFT KNEE TOTAL ARTHROPLASTY REVISION       WITH FROZEN SECTION      PRP PLATELET GEL    Surgeon(s):  Josh Thompson DO    Assistant:  Resident: Nanci Dye DO    Anesthesia: General    Estimated Blood Loss (mL): 200 cc    Fluids: 500 cc crystalloids    Complications: None    Specimens:   ID Type Source Tests Collected by Time Destination   A : left knee #1 Tissue Joint, Knee SURGICAL PATHOLOGY, CULTURE, FUNGUS, CULTURE, ANAEROBIC AND AEROBIC Josh Donna, DO 1/3/2023 1358    B : left knee #2 Tissue Joint, Knee SURGICAL PATHOLOGY, CULTURE, FUNGUS, CULTURE, ANAEROBIC AND AEROBIC Josh Donna, DO 1/3/2023 1359    C : left knee #3 Tissue Joint, Knee SURGICAL PATHOLOGY, CULTURE, FUNGUS, CULTURE, ANAEROBIC AND AEROBIC Josh Donna, DO 1/3/2023 1401    D : left knee #4 Tissue Joint, Knee SURGICAL PATHOLOGY, CULTURE, FUNGUS, CULTURE, ANAEROBIC AND AEROBIC Josh Donna, DO 1/3/2023 1402        Implants:  Implant Name Type Inv.  Item Serial No.  Lot No. LRB No. Used Action   CEMENT BNE 40GM HI VISC RADPQ FOR REV SURG - [id number]  CEMENT BNE 40GM HI VISC RADPQ FOR REV SURG  [employer]- [id number] Left 1 Implanted   CEMENT BNE 40GM HI VISC RADPQ FOR REV SURG - [id number]  CEMENT BNE 40GM HI VISC RADPQ FOR REV SURG  [employer]- [id number] Left 1 Implanted   IMPL KNEE TIB BLOC MOD AUGMENT 6X71MM - [id number]  IMPL KNEE TIB BLOC MOD AUGMENT 6X71MM  [employer] [facility] [id number] Left 1 Implanted   IMPL KNEE TIB BLOC MOD AUGMENT 6X71MM - [id number]  IMPL KNEE TIB BLOC MOD AUGMENT 6X71MM  [facility] [id number] Left 1 Implanted   IMPL KNEE TIB STEM EXT MAXIM REV [id number] - [id number]  IMPL KNEE TIB STEM EXT MAXIM REV [id number]  [employer]- [id number] Left 1 Implanted   COMPONENT FEM 60 MM LT KNEE TI VANGUARD SSK - [id number]  COMPONENT FEM 60 MM LT KNEE TI VANGUARD SSK  [employer]-WD [id number] Left 1 Implanted   TRAY TIB L71MM KNEE INTLOK MOD STEM - [id number]  TRAY TIB L71MM KNEE INTLOK MOD STEM  [employer]WD [id number] Left 1 Implanted   IMPL KNEE TIB STEM EXT MAXIM REV 06K47MM - [id number]  IMPL KNEE TIB STEM EXT MAXIM REV [id number]  [facility] ORTHOPEDICS- [id number] Left 1 Implanted         Drains: * No LDAs found *    Findings:  Left total knee metal hypersensitivity reaction    Electronically signed by Cecelia Beckwith DO on 1/3/2023 at 4:22 PM

## 2023-01-03 NOTE — OP NOTE
Operative Note      Patient: Elvia Sheffield  YOB: 1970  MRN: [id number]    Date of Procedure: 1/3/2023    Pre-Op Diagnosis: Painful left total knee arthroplasty with concern for metal hypersensitivity    Post-Op Diagnosis: Same       Procedure(s):  LEFT KNEE TOTAL ARTHROPLASTY REVISION           Surgeon(s):  Juan Livingston DO    Assistant:   Resident: Merced Peña DO    Anesthesia: General    Estimated Blood Loss (mL): 024     Complications: None    Specimens:   ID Type Source Tests Collected by Time Destination   A : left knee #1 Tissue Joint, Knee SURGICAL PATHOLOGY, CULTURE, FUNGUS, CULTURE, ANAEROBIC AND AEROBIC Juan Grise, DO 1/3/2023 1358    B : left knee #2 Tissue Joint, Knee SURGICAL PATHOLOGY, CULTURE, FUNGUS, CULTURE, ANAEROBIC AND AEROBIC [place] Grise, DO 1/3/2023 1359    C : left knee #3 Tissue Joint, Knee SURGICAL PATHOLOGY, CULTURE, FUNGUS, CULTURE, ANAEROBIC AND AEROBIC [place] Grise, DO 1/3/2023 1401    D : left knee #4 Tissue Joint, Knee SURGICAL PATHOLOGY, CULTURE, FUNGUS, CULTURE, ANAEROBIC AND AEROBIC Juan Grise, DO 1/3/2023 1402        Implants:  Implant Name Type Inv.  Item Serial No.  Lot No. LRB No. Used Action   CEMENT BNE 40GM HI VISC RADPQ FOR REV SURG - [id number]  CEMENT BNE 40GM HI VISC RADPQ FOR REV SURG  [employer] [facility] [id number] Left 1 Implanted   CEMENT BNE 40GM HI VISC RADPQ FOR REV SURG - [id number]  CEMENT BNE 40GM HI VISC RADPQ FOR REV SURG  [employer] [facility] [id number] Left 1 Implanted   IMPL KNEE TIB BLOC MOD AUGMENT 6X71MM - [id number]  IMPL KNEE TIB BLOC MOD AUGMENT 6X71MM  [employer] [facility] [id number] Left 1 Implanted   IMPL KNEE TIB BLOC MOD AUGMENT 6X71MM - [id number]  IMPL KNEE TIB BLOC MOD AUGMENT 6X71MM  [employer] [facility] [facility] [id number] Left 1 Implanted   IMPL KNEE TIB STEM EXT MAXIM REV [id number] - [id number]  IMPL KNEE TIB STEM EXT MAXIM REV [id number]  [employer]- [id number] Left 1 Implanted   COMPONENT FEM 60 MM LT KNEE TI KEVIN SSK - [id number]  COMPONENT FEM 60 MM LT KNEE TI VANGUARD SSK  [employer]- [id number] Left 1 Implanted   TRAY TIB L71MM KNEE INTLOK MOD STEM - [id number]  TRAY TIB L71MM KNEE INTLOK MOD STEM  [employer]- [id number] Left 1 Implanted   IMPL KNEE TIB STEM EXT MAXIM REV [id number] - [id number]  IMPL KNEE TIB STEM EXT MAXIM REV [id number]  [facility] ORTHOPEDICS- [id number] Left 1 Implanted         Drains: * No LDAs found *    Findings: Per operative note    Detailed Description of Procedure:     Sharla Vidales is a 61-year-old female who presented to [address] [place] [address] surgical department for revision left total knee arthroplasty. She underwent left total knee arthroplasty on 12/8/2020 by Dr. Ari Nichole. She was very happy with the result during the first 6 months however started to have increasing pain and swelling with radicular symptoms radiating from her knee. She had a previous history of humeral open reduction and internal fixation with plates and screws that reportedly were removed previously for metal hypersensitivity. The patient is concerned that her pain continues to progress and become worse because of metal hypersensitivity and she would like to consider revision total knee arthroplasty at this time. The patient was identified preoperatively where consent was obtained, signed, placed on the chart. The procedure was described. Her questions were answered. All details of the procedure, as well as risks, benefits and alternatives, including the option of non operative versus operative treatment were discussed.   The patient understands that risks of the surgery include but are not limited to: bleeding, malunion/nonunion, loss of fixation, loss of reduction, hardware failure, angular or rotational deformity, length discrepancy, limp, transfusion, skin blistering or breakdown, progressive post traumatic degenerative joint disease, possible need for further surgery, bone grafting, infection, nerve injury, paralysis, numbness, blood vessel injury, excessive scaring, wound complication or breakdown, failure of symptoms to improve or actual deterioration in condition, significant acute and/or chronic pain, possible need for amputation, permanent loss of motion, and permanent loss of function. As well as the general complications of anesthesia, which include but are not limited to: myocardial infarction and/or heart attack, stroke, multi organ system failure or even possible death, prolonged hospital stay, blood clots, pulmonary embolism, abnormal reaction to medication, visual and neurological disturbances, constipation, ischemic bowel, bowel obstruction, bowel perforation, ileus and mental status changes. No guarantees were made. Patient underwent a preoperative nerve block to the left lower extremity in the preoperative holding area by the anesthesia team.  She was administered IV antibiotics and then taken the operative suite where she was placed upon upon the operative table. Spinal anesthesia was affected. The left lower extremity was prepped and draped in the usual sterile fashion. After an appropriate surgical timeout incision was made over the previous anterior midline incisional scar sharply through the skin and subcutaneous tissue. Full-thickness skin flaps were raised over the extensor mechanism and a medial parapatellar arthrotomy was affected to the knee. Abundant synovitis was excised and sent for pathologic evaluation with frozen sections being negative. For samples were taken from 4 different spots of the knee deep to the capsule. 80 subperiosteal dissection was carried over the proximal medial tibia to the posterior medial corner. The neck knee was flexed and the patella which was noted to be quite stable and well fixed was placed in the lateral gutter.   Retractors were placed to protect the collateral ligaments and the tibial polyethylene was removed. A combination of curettes, osteotomes, small and large oscillating saw blades were utilized to remove the femoral and tibial components with minimal bone loss. Any remaining cement was removed with the [facility] cement set. Minimal bone loss was noted on the tibia and a little bit of bone loss was noted along the anterior distal femur. Sequential reaming of both canals was then made with hand reamers in the hand reamers were utilized to place the tibial and femoral cutting block so that the appropriate cuts could be made. The appropriately sized trial implants were then placed and impacted until fully seated in the trial polyethylene liners were placed. It was felt that the patient was best served with a constrained liner. Full range of motion could be attained and the knee was stable throughout the arc of range of motion and that was with a 5 mm augment on the proximal tibia. All trials were then removed. All bony surfaces were thoroughly irrigated and suctioned. The implantable components were assembled on the back table. 2 bags of cement were mixed and placed on the distal femur and proximal tibia as well as proximal and distal aspects of the implants. The implants were then placed and impacted until fully seated. Cement extruding from the margins of the implants were removed with tonsils and Bristow elevators. The trial polyethylene was placed and the knee was put in full extension until the cement fully polymerized. The trial polyethylene was then removed and the implantable polyethylene was then placed and impacted until fully seated. The anterior locking mechanism was used to lock the polyethylene implant to the tibial tray. The knee was once again put through a range of motion and was found to be stable throughout the arc of range of motion with full range of motion being easily attained.   The knee was then thoroughly irrigated and suction. Platelet rich plasma spray was sprayed over the inside of the capsule and then the medial parapatellar approach was closed using absorbable suture. The platelet poor plasma spray was placed in subcutaneous tissue and that layer was closed with absorbable suture as well. A subcuticular running STRATAFIX suture was utilized for subcuticular closure. Dermabond was used cutaneously. Sterile dressing was applied. Anesthesia was reversed. Patient was taken to postop recovery room in stable condition. There were no immediate postoperative complications and the procedure was tolerated well.     Electronically signed by Jefry Vidales DO on 1/3/2023 at 4:26 PM

## 2023-01-03 NOTE — INTERVAL H&P NOTE
Interval H&P Note    Pt Name: Layton Gonzalez  MRN: [id number]  YOB: 1970  Date of evaluation: 1/3/2023      [x] I have reviewed in epic the H&P by QUENTIN Quick CNP dated 12/14/22 done in [facility] for an Interval History and Physical note. [x] I have examined  Layton Gonzalez  There are no changes to the patient who is scheduled for LEFT KNEE TOTAL ARTHROPLASTY REVISION   WITH POSSIBLE CEMENT SPACER, WITH FROZEN SECTION, MAR[employer]  METAL HYPER-SENSITIVITY, ORDOÑEZ & NEPHEW FOR SPACER - PT WANTS PRP PLATELET GEL by Belkis Perez, DO FOR PRESENCE OF LEFT ARTIFICIAL KNEE JOINT [Z96.652]. The patient denies new health changes, fever, chills, wheezing, cough, increased SOB, chest pain, open sores or wounds. No Ibuprofen past 2 weeks. Hx Prediabetic on glimepiride  POC BS in epic Last A1c 6.8% on 12/14/22    Vital signs: /77   Pulse 70   Temp 97.8 °F (36.6 °C) (Temporal)   Resp 15   SpO2 97%     Allergies:  Meperidine hcl and Sulfa antibiotics    Medications:    Prior to Admission medications    Medication Sig Start Date End Date Taking?  Authorizing Provider   calcium carbonate (OSCAL) 500 MG TABS tablet Take 500 mg by mouth daily With Vitamin D  Patient not taking: Reported on 12/14/2022    Historical Provider, MD   Multiple Vitamins-Minerals (HAIR SKIN AND NAILS FORMULA PO) Take 1 tablet by mouth daily    Historical Provider, MD   ibuprofen (ADVIL;MOTRIN) 800 MG tablet Take 800 mg by mouth every 6 hours as needed for Pain  Patient not taking: Reported on 12/14/2022    Historical Provider, MD   Mometasone Furoate (NASONEX NA) by Nasal route daily  Patient not taking: Reported on 12/14/2022    Historical Provider, MD   Turmeric (QC TUMERIC COMPLEX PO) Take by mouth daily  Patient not taking: Reported on 12/14/2022    Historical Provider, MD   polycarbophil (FIBERCON) 625 MG tablet Take 625 mg by mouth daily Unsure of dose  Patient not taking: Reported on 12/14/2022    Historical Provider, MD citalopram (CELEXA) 40 MG tablet Take 40 mg by mouth daily 2/23/22   Historical Provider, MD   NONFORMULARY Take 1 tablet by mouth daily Cranberry extract  Patient not taking: Reported on 12/14/2022    Historical Provider, MD   glimepiride (AMARYL) 1 MG tablet Take 0.5 mg by mouth daily Takes 1/2 of a 1 mg tablet 11/23/20   Historical Provider, MD   loratadine (CLARITIN) 10 MG capsule Take 10 mg by mouth daily  Patient not taking: Reported on 12/14/2022    Historical Provider, MD   Ascorbic Acid (VITAMIN C PO) Take 250 mg by mouth nightly    Historical Provider, MD   omeprazole (PRILOSEC) 20 MG delayed release capsule Take 20 mg by mouth daily as needed     Historical Provider, MD   metoprolol tartrate (LOPRESSOR) 25 MG tablet Take 25 mg by mouth daily  11/4/19   Historical Provider, MD         This is a 50 y.o.obese female who is pleasant, cooperative, alert and oriented x3, in no acute distress. Heart: Heart sounds are normal.  HR 70 regular rate and rhythm without murmur, gallop or rub. Lungs: Normal respiratory effort with equal expansion, good air exchange, unlabored and clear to auscultation without wheezes or rales bilaterally   Abdomen:  obese, nontender, nondistended with bowel sounds    Extremities: No peripheral edema or calf tenderness   Pulses palpable     Labs:  Recent Labs     12/14/22  0808   HGB 13.4   HCT 43.7   WBC 10.5   MCV 89.0         K 4.1      CO2 27   BUN 16   CREATININE 0.59   GLUCOSE 148*   AST 16   ALT 16   LABALBU 4.3       No results for input(s): COVID19 in the last 720 hours.     Kiersten Richmond, APRN - CNP  Electronically signed 1/3/2023 at 11:48 AM

## 2023-01-03 NOTE — DISCHARGE INSTRUCTIONS
ANY ORTHOPEDIC QUESTIONS OR ANY OTHER CONCERNS YOU MAY CALL THE ORTHOPEDIC COORDINATOR:  Ada Bashir RN, MSN  [phone]  [email or web address]    DISCHARGE INSTRUCTIONS  Caring for yourself after joint replacement surgery (Total Hip and Total Knee Replacement)    Activity and Therapy  Receive physical therapy three times per week. (Pain medication one hour prior to therapy)   Perform PT exercises on own when not receiving home or outpatient PT. Ideally exercises should be at least two times a day. Increase level of activity and ambulation each day. Perform deep breathing exercises daily. Patient provides self-care when possible. Work on Range of motion for Total knee patients. No pillow under the knee for Total knee patients. Elevate the surgical leg when seated. No driving until cleared by surgeon      Diet:  Increase oral intake of fruits, fiber and water to prevent constipation. Drink fluids frequently and take stool softeners to aid in bowel motility. Increase protein intake/reduce high-sugar intake to help promote healing and prevent infection. Incision Care:  Keep Aquacel or other dressing intact until seen and removed by surgeon, unless saturated, in which case, call surgeon and request instructions. If dressing falls off, call surgeon. [facility] on in the am and off in the pm to reduce swelling. Ice affected area four times a day, for twenty minutes. Pain Medications and Anticoagulant  You have been place on an anticoagulant to prevent blood clots. Take this medication exactly as prescribed. Be alert for signs of bleeding. Take care not to injure yourself. You have been provided pain medicine to control your pain. Do not take more narcotics than prescribed. You may begin weaning from narcotics as your pain level improves by decreasing the amount or frequency of the narcotics. You may also take plain acetaminophen as an alternate to the narcotics.    Never exceed the recommended dosage. Ice, rest and elevating the surgical limb also help with pain control. When to call the Surgeon:  Increased redness, warmth, drainage, swelling or odor from incision site. Temperature above 101 degrees. Pain not controlled by prescribed medications. Calf tenderness, swelling, or redness. Shortness of breath or chest pain. If you cannot urinate and have been consuming liquids  Any incision or surgical-related concerns. Call surgeon with concerns PRIOR TO going to hospital.    Normal Conditions:  Swelling in the operative leg: this should reduce over time. Bruising behind the knee and around surgical area. Some post-operative pain. Constipation related to pain medications/decreased mobility. (Increase fiber & water intake.)   Slight warmth of operative leg. Fatigue and moderate pain after therapy. Numbness near the incision site. Nausea - take pain medications with food. Cut back on pain medication. NOTE: Remember to go to follow-up orthopedic appointment with surgeon      Keep it Clean - Post-Operative Home instructions    These instructions are to help you have the best possible recovery after your surgical procedure. Vitor Betancur is here to support you. If you have questions, call [phone] Monday through Friday from 7:30AM to 8:30PM to speak to a nurse. If you need to speak to someone outside of these hours, call your physician. Incision Dos and Donts  Do wash hands before and after dressing changes or when you have had any contact with your incision. Use hand  or antibacterial soap. Do keep your incision clean and dry. Its OK to wash the skin around your incision with mild soap and water. Do change your dressing as you were told. Do notify your doctor if the dressing becomes wet or dirty. Do use a clean washcloth every time when cleaning your incision. Do sleep on clean linens. Do keep pets away from incision site.   Dont sit in a bathtub, pool, or hot tub until your incision is fully closed and any drains are removed. Dont scrub, pick, scratch, or pull at your incision. Dont use oils, lotions, or creams on your incision unless your healthcare provider approves it. Follow-up  You will have one or more follow-up visits with your healthcare provider. These are needed to check how well youre healing. Your drain, stitches, or staples may also be removed during these visits. Do not miss your follow-up visit, even if you are feeling better. Call your healthcare provider right away if you have the following:  Fever of 100.4°F (38°C) or higher, or as advised by your healthcare provider. Chest pain or trouble breathing. Pain or tenderness in your leg(s). Increased pain, redness, swelling, bleeding, or foul-smelling drainage at the incision site. Incision changes, separates or is hot to the touch. Problems with the drain if you have one. Itchy, swollen skin; skin rash.     Medicines, Diet, and Activity:   Refer to your discharge paperwork for further instructions

## 2023-01-03 NOTE — PROGRESS NOTES
Pharmacist Review and Automatic Dose Adjustment of Prophylactic Enoxaparin         The reviewing pharmacist has made an adjustment to the ordered enoxaparin dose or converted to UFH per the approved [facility] protocol and table as identified below. Ed Grover is a 50 y.o. female. No results for input(s): CREATININE in the last 72 hours. Estimated Creatinine Clearance: 140 mL/min (based on SCr of 0.59 mg/dL). No results for input(s): HGB, HCT, PLT in the last 72 hours. No results for input(s): INR in the last 72 hours.     Height:   Ht Readings from Last 1 Encounters:   01/03/23 5' 5\" (1.651 m)     Weight:  Wt Readings from Last 1 Encounters:   01/03/23 241 lb (109.3 kg)               Plan: Based upon the patient's weight and renal function    Ordered: Enoxaparin 40mg Daily    Changed/converted to    New Order: Enoxaparin 30mg SUBQ BID      Thank you,  Sharona Haddad [id number], [facility]  1/3/2023, 5:06 PM

## 2023-01-03 NOTE — PROGRESS NOTES
Orthopedic Coordinator Note    Patient s/p left total Knee replacement REVISION on 01/03/2023 WITH DR. Carlos Reyes. The following appointments are currently scheduled:    Post-op with surgeon 01/17/2023 AT 0945 IN [place] WITH PAUL MARTIN. Physical Therapy OPPT [facility]OHANNY PT 01/09/2023 AT 1000. Wheeled walker order is not entered  Face to face documentation is NOT ENTERED, PT HAS A WALKER FROM 12/09/202 KA. DVT Prophylaxis: 81MG EC ASA BID X 6 WEEKS.       Any questions please contact Ruchi Fitzgerald RN, MSN  [phone]      Electronically signed by: Ruchi Fitzgerald RN on 1/3/2023 at 9:42 AM

## 2023-01-04 VITALS
SYSTOLIC BLOOD PRESSURE: 125 MMHG | HEIGHT: 65 IN | BODY MASS INDEX: 40.15 KG/M2 | OXYGEN SATURATION: 96 % | TEMPERATURE: 98.1 F | DIASTOLIC BLOOD PRESSURE: 80 MMHG | WEIGHT: 241 LBS | RESPIRATION RATE: 18 BRPM | HEART RATE: 81 BPM

## 2023-01-04 LAB
ABSOLUTE EOS #: 0.27 K/UL (ref 0–0.44)
ABSOLUTE IMMATURE GRANULOCYTE: 0.02 K/UL (ref 0–0.3)
ABSOLUTE LYMPH #: 1.68 K/UL (ref 1.1–3.7)
ABSOLUTE MONO #: 0.75 K/UL (ref 0.1–1.2)
ANION GAP SERPL CALCULATED.3IONS-SCNC: 10 MMOL/L (ref 9–17)
BASOPHILS # BLD: 1 % (ref 0–2)
BASOPHILS ABSOLUTE: 0.05 K/UL (ref 0–0.2)
BUN BLDV-MCNC: 20 MG/DL (ref 6–20)
BUN/CREAT BLD: 32 (ref 9–20)
CALCIUM SERPL-MCNC: 8.1 MG/DL (ref 8.6–10.4)
CHLORIDE BLD-SCNC: 100 MMOL/L (ref 98–107)
CO2: 24 MMOL/L (ref 20–31)
CREAT SERPL-MCNC: 0.63 MG/DL (ref 0.5–0.9)
EOSINOPHILS RELATIVE PERCENT: 3 % (ref 1–4)
GFR SERPL CREATININE-BSD FRML MDRD: >60 ML/MIN/1.73M2
GLUCOSE BLD-MCNC: 135 MG/DL (ref 70–99)
HCT VFR BLD CALC: 35.1 % (ref 36.3–47.1)
HEMOGLOBIN: 10.7 G/DL (ref 11.9–15.1)
IMMATURE GRANULOCYTES: 0 %
LYMPHOCYTES # BLD: 21 % (ref 24–43)
MCH RBC QN AUTO: 27.5 PG (ref 25.2–33.5)
MCHC RBC AUTO-ENTMCNC: 30.5 G/DL (ref 28.4–34.8)
MCV RBC AUTO: 90.2 FL (ref 82.6–102.9)
MONOCYTES # BLD: 9 % (ref 3–12)
NRBC AUTOMATED: 0 PER 100 WBC
PDW BLD-RTO: 13.4 % (ref 11.8–14.4)
PLATELET # BLD: 190 K/UL (ref 138–453)
PMV BLD AUTO: 10.3 FL (ref 8.1–13.5)
POTASSIUM SERPL-SCNC: 3.9 MMOL/L (ref 3.7–5.3)
RBC # BLD: 3.89 M/UL (ref 3.95–5.11)
SEG NEUTROPHILS: 66 % (ref 36–65)
SEGMENTED NEUTROPHILS ABSOLUTE COUNT: 5.31 K/UL (ref 1.5–8.1)
SODIUM BLD-SCNC: 134 MMOL/L (ref 135–144)
WBC # BLD: 8.1 K/UL (ref 3.5–11.3)

## 2023-01-04 PROCEDURE — 36415 COLL VENOUS BLD VENIPUNCTURE: CPT

## 2023-01-04 PROCEDURE — 97535 SELF CARE MNGMENT TRAINING: CPT

## 2023-01-04 PROCEDURE — 2580000003 HC RX 258

## 2023-01-04 PROCEDURE — 6370000000 HC RX 637 (ALT 250 FOR IP)

## 2023-01-04 PROCEDURE — 97110 THERAPEUTIC EXERCISES: CPT

## 2023-01-04 PROCEDURE — 6360000002 HC RX W HCPCS

## 2023-01-04 PROCEDURE — 1200000000 HC SEMI PRIVATE

## 2023-01-04 PROCEDURE — 97116 GAIT TRAINING THERAPY: CPT

## 2023-01-04 PROCEDURE — 97530 THERAPEUTIC ACTIVITIES: CPT

## 2023-01-04 PROCEDURE — 85025 COMPLETE CBC W/AUTO DIFF WBC: CPT

## 2023-01-04 PROCEDURE — 97166 OT EVAL MOD COMPLEX 45 MIN: CPT

## 2023-01-04 PROCEDURE — 80048 BASIC METABOLIC PNL TOTAL CA: CPT

## 2023-01-04 PROCEDURE — 97162 PT EVAL MOD COMPLEX 30 MIN: CPT

## 2023-01-04 RX ADMIN — OXYCODONE 10 MG: 5 TABLET ORAL at 02:19

## 2023-01-04 RX ADMIN — KETOROLAC TROMETHAMINE 15 MG: 15 INJECTION, SOLUTION INTRAMUSCULAR; INTRAVENOUS at 01:17

## 2023-01-04 RX ADMIN — CELECOXIB 200 MG: 200 CAPSULE ORAL at 08:19

## 2023-01-04 RX ADMIN — DIAZEPAM 5 MG: 5 TABLET ORAL at 05:03

## 2023-01-04 RX ADMIN — OXYCODONE 10 MG: 5 TABLET ORAL at 06:23

## 2023-01-04 RX ADMIN — OXYCODONE 10 MG: 5 TABLET ORAL at 11:10

## 2023-01-04 RX ADMIN — ACETAMINOPHEN 1000 MG: 500 TABLET, FILM COATED ORAL at 06:23

## 2023-01-04 RX ADMIN — SODIUM CHLORIDE, PRESERVATIVE FREE 10 ML: 5 INJECTION INTRAVENOUS at 08:20

## 2023-01-04 RX ADMIN — CEFAZOLIN 2000 MG: 10 INJECTION, POWDER, FOR SOLUTION INTRAVENOUS at 02:13

## 2023-01-04 RX ADMIN — PREGABALIN 75 MG: 75 CAPSULE ORAL at 08:19

## 2023-01-04 RX ADMIN — ENOXAPARIN SODIUM 30 MG: 100 INJECTION SUBCUTANEOUS at 08:19

## 2023-01-04 RX ADMIN — ACETAMINOPHEN 1000 MG: 500 TABLET, FILM COATED ORAL at 01:17

## 2023-01-04 ASSESSMENT — PAIN DESCRIPTION - ORIENTATION
ORIENTATION: LEFT

## 2023-01-04 ASSESSMENT — PAIN DESCRIPTION - DESCRIPTORS
DESCRIPTORS: ACHING

## 2023-01-04 ASSESSMENT — PAIN DESCRIPTION - FREQUENCY
FREQUENCY: INTERMITTENT

## 2023-01-04 ASSESSMENT — PAIN DESCRIPTION - PAIN TYPE
TYPE: SURGICAL PAIN
TYPE: SURGICAL PAIN

## 2023-01-04 ASSESSMENT — PAIN DESCRIPTION - ONSET
ONSET: ON-GOING

## 2023-01-04 ASSESSMENT — PAIN SCALES - GENERAL
PAINLEVEL_OUTOF10: 8
PAINLEVEL_OUTOF10: 4
PAINLEVEL_OUTOF10: 6
PAINLEVEL_OUTOF10: 7
PAINLEVEL_OUTOF10: 7

## 2023-01-04 ASSESSMENT — PAIN DESCRIPTION - LOCATION
LOCATION: KNEE

## 2023-01-04 ASSESSMENT — PAIN - FUNCTIONAL ASSESSMENT
PAIN_FUNCTIONAL_ASSESSMENT: ACTIVITIES ARE NOT PREVENTED

## 2023-01-04 NOTE — PROGRESS NOTES
Physical Therapy  Facility/Department: [facility] SURG  Physical Therapy Initial Assessment- POD 1    Name: Smitha Davenport  : 1970  MRN: [id number]  Date of Service: 2023    Discharge Recommendations:    Pt presenting with new musculoskeletal dysfunction and would benefit from additional therapy at time of discharge. Please refer to the AM-PAC score for current functional status. L TKA by Dr. Viry Escudero 22    PT Equipment Recommendations  Equipment Needed: No      Patient Diagnosis(es): The primary encounter diagnosis was Post-op pain. A diagnosis of Presence of left artificial knee joint was also pertinent to this visit. Past Medical History:  has a past medical history of Arthritis, Bladder spasms, Depression, Diabetes mellitus ([address] Fracture, GERD (gastroesophageal reflux disease), Hypertension, Kidney stones, Pain, Personal history of other medical treatment, Seasonal allergies, Snores, Under care of team, Urinary urgency, UTI (urinary tract infection), Wears glasses, and Wellness examination. Past Surgical History:  has a past surgical history that includes Knee arthroscopy (Left, ); Shoulder arthroscopy (Right, ); Nasal septum surgery (); Hysterectomy (); Tonsillectomy; Knee arthroscopy (Left, 2020); Tooth Extraction (2020); Total knee arthroplasty (Left, 2020); Humerus fracture surgery (Left, 10/01/2021); Forearm surgery (Left, 10/1/2021); shoulder surgery (Left, 10/01/2021); Colonoscopy; LASIK (Bilateral, ); shoulder surgery (Left, 2022); and shoulder surgery (Left, 2022). Assessment   Body Structures, Functions, Activity Limitations Requiring Skilled Therapeutic Intervention: Decreased ROM; Decreased strength;Decreased functional mobility   Assessment: See GOALS section.   Pt. is safe for d/c home POD 1 from PT standpoint,  Specific Instructions for Next Treatment: d/c home  Therapy Prognosis: Excellent  Decision Making: Medium Complexity  Requires PT Follow-Up: Yes  Activity Tolerance  Activity Tolerance: Patient tolerated treatment well     Plan   Physcial Therapy Plan  General Plan: 2 times a day 7 days a week  Specific Instructions for Next Treatment: d/c home  Safety Devices  Type of Devices: All fall risk precautions in place, Call light within reach, Gait belt, Nurse notified, Patient at risk for falls     Restrictions  Restrictions/Precautions  Restrictions/Precautions: Fall Risk, General Precautions, Up as Tolerated, Bed Alarm, Weight Bearing  Lower Extremity Weight Bearing Restrictions  Left Lower Extremity Weight Bearing: Weight Bearing As Tolerated     Subjective   General  Chart Reviewed: Yes  Patient assessed for rehabilitation services?: Yes  Family / Caregiver Present: Yes (spouse)  Follows Commands: Within Functional Limits  Subjective  Subjective: First L TKA 2 yrs.  ago by Dr. Verónica Harrington- felt she was allergic to hardware         Social/Functional History  Social/Functional History  Lives With: Spouse  Type of Home: House  Home Layout: Multi-level (tri level, bed and bathroom on upper level, kitchen on lower level)  Home Access: Stairs to enter without rails  Entrance Stairs - Number of Steps: 1  Bathroom Shower/Tub: Tub/Shower unit  Bathroom Toilet: Handicap height  Bathroom Equipment: Shower chair  Home Equipment: Walker, rolling, Cane  Has the patient had two or more falls in the past year or any fall with injury in the past year?: No (one \"fluke\" fall from slipping on something in bathroom 2 months ago)  ADL Assistance: Independent  Homemaking Assistance: Independent  Ambulation Assistance: Independent  Transfer Assistance: Independent  Active : Yes  Mode of Transportation: Car  Occupation: Full time employment  Type of Occupation:  at [employer]- works from home 4 days/week  [address]: walk, go to [place], shop  Vision/Hearing       Cognition   Orientation  Overall Orientation Status: Within Normal Limits  Cognition  Overall Cognitive Status: WNL     Objective   Heart Rate: 81  Heart Rate Source: Monitor  BP: 125/80  BP Location: Left upper arm  Patient Position: Supine  MAP (Calculated): 95  Resp: 18  SpO2: 96 %  O2 Device: None (Room air)     Observation/Palpation  Posture: Good  Observation: Pt. resting in bed, feeling very good about new TKA. Ready for d/c home. Gross Assessment  Sensation: Impaired (slight diminished sensation L medial thigh and lower leg from nerve block)        Strength RLE  Strength RLE: WNL  Strength LLE  Comment: + SLR, + LAQ           Bed mobility  Supine to Sit: Stand by assistance  Sit to Supine: Stand by assistance  Scooting: Stand by assistance  Transfers  Sit to Stand: Contact guard assistance  Stand to Sit: Contact guard assistance  Comment: Initial cues needed for safe hand placement with RW  Ambulation  Surface: Level tile  Device: Rolling Walker  Assistance: Contact guard assistance  Quality of Gait: Pt. with good pain control and comfortable with step-through gait, using RW for balance only. Distance: 120ft. Stairs/Curb  Stairs?: Yes  Stairs  # Steps : 5  Rails: Bilateral  Assistance: Contact guard assistance  Comment: Excellent demo stairs. Exercise Treatment: Issued pt. written TKA HEP including seated and supine ther ex. Pt. with good demo and understanding of all ex. OutComes Score                                                  AM-PAC Score  AM-PAC Inpatient Mobility Raw Score : 24 (01/04/23 1208)  AM-PAC Inpatient T-Scale Score : 61.14 (01/04/23 1208)  Mobility Inpatient CMS 0-100% Score: 0 (01/04/23 1208)  Mobility Inpatient CMS G-Code Modifier : [address] (01/04/23 1208)          Tinneti Score       Goals  Pt. has met all PT goals for safe same day discharge including safe bed mobility and transfer techniques, safe ambulation with RW including walker safety, training for safe stair negotiation, HEP and edu.  for frequent mobility at home, and verbal review of car transfer. Pt. educated on all surgery specific precautions. Time taken to answer all questions from pt. and  to ensure preparedness. Education  Patient Education  Education Given To: Patient; Family  Education Provided: Role of Therapy;Plan of Care;Home Exercise Program  Education Provided Comments: see GOALS section, see EX section  Education Method: Demonstration;Verbal;Printed Information/Hand-outs  Education Outcome: Verbalized understanding;Demonstrated understanding      Therapy Time   Individual Concurrent Group Co-treatment   Time In 0848         Time Out 1000         Minutes 72          Treatment time:  62min. Co-treatment with OT warranted first time up day of surgery. Cotx due to potential risk of decreased sensation, muscle control and proprioception from spinal epidural and/or regional block. Decreased safety and independence requiring 2 skilled therapy professionals to address individual discipline's goals.           Finn Hodge, PT

## 2023-01-04 NOTE — PROGRESS NOTES
Orthopedic Progress Note    Patient:  Jimmy Antonio, 50 y.o. female  YOB: 1970       Subjective:  Patient seen and examined. POD1 from L revision TKA. No complaints or concerns. Pain controlled on current regimen. No issues overnight. Denies fever, HA, CP, SOB, N/V. Tolerating PO intake. Working w/ PT and doing well. Objective:   Vitals:    01/04/23 1145   BP: 125/80   Pulse: 81   Resp: 18   Temp: 98.1 °F (36.7 °C)   SpO2: 96%     Gen: NAD, cooperative    Cardiovascular: Regular rate    Respiratory: Symmetric chest rise. No accessory muscle use    LLE: Dressing in place which is clean dry intact. No motor deficits on exam. Able to DF and PF her ankle. Knee AROM is 10-70 deg. No paraesthesias, or sensory deficits to the extremity. DP pulse is palpable. Assessment/Plan: 50 y.o. female who is POD1 from a L revision TKA:      - Discharge planning today, this afternoon  - WBAT  - Maintain dressing until post op appointment. Okay to shower  - General diet  - Complete post op abx  - Lovenox until discharge.  Then ASA  - Pain control  - PT/OT  - Follow up w/ at scheduled post op appointment on 1/17/23    Electronically signed by Pascale Mason DO on 1/4/2023 at 12:26 PM.

## 2023-01-04 NOTE — PROGRESS NOTES
Occupational Therapy  Facility/Department: [facility]  Rehabilitation Occupational Therapy Daily Treatment Note    Date: 23  Patient Name: Yosef Salazar       Room:   MRN: [id number]  Account: [de-identified]   : 1970  (50 y.o.) Gender: female     Past Medical History:  has a past medical history of Arthritis, Bladder spasms, Depression, Diabetes mellitus ([address] Fracture, GERD (gastroesophageal reflux disease), Hypertension, Kidney stones, Pain, Personal history of other medical treatment, Seasonal allergies, Snores, Under care of team, Urinary urgency, UTI (urinary tract infection), Wears glasses, and Wellness examination. Past Surgical History:   has a past surgical history that includes Knee arthroscopy (Left, ); Shoulder arthroscopy (Right, ); Nasal septum surgery (); Hysterectomy (); Tonsillectomy; Knee arthroscopy (Left, 2020); Tooth Extraction (2020); Total knee arthroplasty (Left, 2020); Humerus fracture surgery (Left, 10/01/2021); Forearm surgery (Left, 10/1/2021); shoulder surgery (Left, 10/01/2021); Colonoscopy; LASIK (Bilateral, ); shoulder surgery (Left, 2022); shoulder surgery (Left, 2022); and Revision total knee arthroplasty (Left, 1/3/2023). Restrictions  Restrictions/Precautions: Fall Risk;General Precautions; Up as Tolerated; Bed Alarm;Weight Bearing  Other position/activity restrictions: URVASHI hose. Left Lower Extremity Weight Bearing: Weight Bearing As Tolerated  Required Braces or Orthoses?: No    Subjective  Subjective: \"I am doing good just really tired\". Restrictions/Precautions: Fall Risk;General Precautions; Up as Tolerated; Bed Alarm;Weight Bearing   Objective     Cognition  Overall Cognitive Status: WNL  Orientation  Overall Orientation Status: Within Normal Limits  Orientation Level: Oriented X4         ADL  Grooming/Oral Hygiene  Assistance Level:  Independent  Skilled Clinical Factors: Independent for grooming task with set up at bedside  Upper Extremity Bathing  Assistance Level: Independent  Skilled Clinical Factors: Independent in shower with bathig UE  Lower Extremity Bathing  Assistance Level: Modified independent  Skilled Clinical Factors: Mod I with bathing LE with use of grab bars and hand held shower. Upper Extremity Dressing  Assistance Level: Independent  Skilled Clinical Factors: Independent with sitting upright on bedside. Lower Extremity Dressing  Assistance Level: Minimal assistance  Skilled Clinical Factors: Min assist with donning underwear and pants over feet. Putting On/Taking Off Footwear  Assistance Level: Maximum assistance  Skilled Clinical Factors: Max assist with donning URVASHI hose and  socks. Toileting  Skilled Clinical Factors: No need at this time. Tub/Shower Transfers  Type: Shower  Transfer From: Rolling walker  Transfer To: Shower chair with back  Additional Factors: Set-up; Verbal cues; With handrails  Assistance Level: Stand by assist  Skilled Clinical Factors: SBA for transfer from EOB to shower bench with use of RW. Functional Mobility  Device: Rolling walker  Activity: To/From bathroom  Assistance Level: Stand by assist  Skilled Clinical Factors: SBA for functional transfer to shower with use of RW. Pt. displayed proper safety and control with RW without dizziness and good pain toleration. Bed Mobility  Overall Assistance Level: Independent  Additional Factors: Set-up  Bridging  Assistance Level: Independent  Roll Left  Assistance Level: Independent  Roll Right  Assistance Level: Independent  Sit to Supine  Assistance Level: Independent  Supine to Sit  Assistance Level: Independent  Scooting  Assistance Level: Independent  Transfers  Surface: From bed (From EOB to shower chair.)  Additional Factors: Set-up; Verbal cues  Device: Walker  Sit to Stand  Assistance Level: Stand by assist  Stand to Sit  Assistance Level: Stand by assist  Skilled Clinical Factors: SBA with use of RW Assessment  Assessment  Assessment: Pt. completed showering task with SBA with functional transfer. Pt. completed bathing independently. Pt. and  educated on safety awareness and AE to use with URVASHI hose and showering at home. Skilled OT warranted to promote I/safety to return pt to prior living arrangement with assist as needed. Activity Tolerance: Patient tolerated treatment well  Safety Devices  Safety Devices in place: Yes  Type of devices: All fall risk precautions in place;Call light within reach;Nurse notified; Left in bed;Gait belt  Restraints  Initially in place: No    Patient Education  Education  Education Given To: Patient; Family  Education Provided: Role of Therapy;Plan of Care;Safety;Transfer Training;ADL Function  Education Provided Comments: Pt. educated on AE to assist with URVASHI hose and showering while at home. Pt's  also educated on equipment and tech to assist with care. Education Method: Demonstration;Verbal  Barriers to Learning: None  Education Outcome: Verbalized understanding    Plan  Occupational Therapy Plan  Times Per Week: 1/4: D/C  Current Treatment Recommendations: Self-Care / ADL; Safety education & training;Functional mobility training;Balance training; Endurance training;Patient/Caregiver education & training;Equipment evaluation, education, & procurement  Additional Comments: D/C    Goals  Patient Goals   Patient goals : to go home  Short Term Goals  Time Frame for Short Term Goals: by discharge, pt will  Short Term Goal 1: demo SBA/S with ADL transfers with AD/DME as needed and good safety  Short Term Goal 2: demo SBA/S with functional mob in room with good safety, pacing for ADL Completion  Short Term Goal 3: demo SBA with LB AdLs except hose with AE/DME as needed and good safety  Short Term Goal 4: demo and verb good understanding of ed provided on ADL tecsh, TKA precautions, fall prevention, equip needs, and d/c recommendations  Additional Goals?: No    AM-PAC Score        AM-PAC Inpatient Daily Activity Raw Score: 21 (01/04/23 1259)  AM-PAC Inpatient ADL T-Scale Score : 44.27 (01/04/23 1259)  ADL Inpatient CMS 0-100% Score: 32.79 (01/04/23 1259)  ADL Inpatient CMS G-Code Modifier : CJ (01/04/23 1259)      Therapy Time   Individual Concurrent Group Co-treatment   Time In 6070         Time Out 12         Minutes 47             RN reports patient is medically stable for therapy treatment this date. Chart reviewed prior to treatment and patient is agreeable for therapy. All lines intact and patient positioned comfortably at end of treatment. All patient needs addressed prior to ending therapy session.        Raj Merino WILLIAM

## 2023-01-04 NOTE — PROGRESS NOTES
Pt discharged to home in stable condition with belongings  Discharge instructions given  Pt denies having any further questions at this time  Personal items given to patient at discharge  Patient/family state they have everything they were admitted with. 33

## 2023-01-04 NOTE — PROGRESS NOTES
Occupational Therapy  Facility/Department: [facility] MED SURG  Occupational Therapy Initial Assessment    Name: Lisa Muñoz  : 1970  MRN: [id number]  Date of Service: 2023    L TKA 1/3/2023    RN reports patient is medically stable for therapy treatment this date. Chart reviewed prior to treatment and patient is agreeable for therapy. All lines intact and patient positioned comfortably at end of treatment. All patient needs addressed prior to ending therapy session. Patient Diagnosis(es): The primary encounter diagnosis was Post-op pain. A diagnosis of Presence of left artificial knee joint was also pertinent to this visit. Past Medical History:  has a past medical history of Arthritis, Bladder spasms, Depression, Diabetes mellitus ([facility] [address] Fracture, GERD (gastroesophageal reflux disease), Hypertension, Kidney stones, Pain, Personal history of other medical treatment, Seasonal allergies, Snores, Under care of team, Urinary urgency, UTI (urinary tract infection), Wears glasses, and Wellness examination. Past Surgical History:  has a past surgical history that includes Knee arthroscopy (Left, ); Shoulder arthroscopy (Right, ); Nasal septum surgery (); Hysterectomy (); Tonsillectomy; Knee arthroscopy (Left, 2020); Tooth Extraction (2020); Total knee arthroplasty (Left, 2020); Humerus fracture surgery (Left, 10/01/2021); Forearm surgery (Left, 10/1/2021); shoulder surgery (Left, 10/01/2021); Colonoscopy; LASIK (Bilateral, ); shoulder surgery (Left, 2022); and shoulder surgery (Left, 2022). Assessment   Performance deficits / Impairments: Decreased functional mobility ; Decreased ADL status  Assessment: pt will be safe for d/c home following next OT session for shower.  Pt demo's/verb good understanding of all education  Prognosis: Good  Decision Making: Medium Complexity  REQUIRES OT FOLLOW-UP: Yes  Activity Tolerance  Activity Tolerance: Patient Tolerated treatment well        Plan   Occupational Therapy Plan  Times Per Week: eval +2 visits for d/c home this date  Current Treatment Recommendations: Self-Care / ADL, Safety education & training, Functional mobility training, Balance training, Endurance training, Patient/Caregiver education & training, Equipment evaluation, education, & procurement     Restrictions  Restrictions/Precautions  Restrictions/Precautions: Fall Risk, General Precautions, Up as Tolerated, Bed Alarm, Weight Bearing  Lower Extremity Weight Bearing Restrictions  Left Lower Extremity Weight Bearing: Weight Bearing As Tolerated    Subjective   General  Chart Reviewed: Yes  Patient assessed for rehabilitation services?: Yes  Family / Caregiver Present: No     Social/Functional History  Social/Functional History  Lives With: Spouse  Type of Home: House  Home Layout: Multi-level (tri level, bed and bathroom on upper level, kitchen on lower level)  Home Access: Stairs to enter without rails  Entrance Stairs - Number of Steps: 1  Bathroom Shower/Tub: Tub/Shower unit  Bathroom Toilet: Handicap height  Bathroom Equipment: Shower chair  Home Equipment: Sae Mervin, rolling, Cane  Has the patient had two or more falls in the past year or any fall with injury in the past year?: No (one \"fluke\" fall from slipping on something in bathroom 2 months ago)  ADL Assistance: Independent  Homemaking Assistance: Independent  Ambulation Assistance: Independent  Transfer Assistance: Independent  Active : Yes  Mode of Transportation: Car  Occupation: Full time employment  Type of Occupation:  at [employer]- works from home 4 days/week  [address]: walk, go to [employer], shop       Objective   Heart Rate: 81  Heart Rate Source: Monitor  BP: 125/80  BP Location: Left upper arm  Patient Position: Supine  MAP (Calculated): 95  Resp: 18  SpO2: 96 %  O2 Device: None (Room air)          Observation/Palpation  Posture: Good  Observation: Pt. resting in bed, feeling very good about new TKA. Ready for d/c home. Safety Devices  Type of Devices: All fall risk precautions in place;Call light within reach;Gait belt;Nurse notified; Patient at risk for falls  Balance  Sitting: Intact  Standing: High guard (CGA with RW)  Gait  Overall Level of Assistance: Contact-guard assistance (pt completed functional mob in room and hallway to rehab gym with CGA. Pt having tendency to turn feet outwards, so cued on safety with RW to assure her feet don't hit walker legs. No buckling noted, good balance with RW support). Pt ed on safe use of RW during functional contexts at home inc. ADL/IADL completion. Pt ed on safe way to approach sink, counter, and high/low reaching. Pt ed importance of staying within device at all times when navigating doorways and turning. Interventions: Safety awareness training;Demonstration;Verbal cues  Toilet Transfers  Equipment Used: Standard toilet  Toilet Transfer: Contact guard assistance  AROM: Within functional limits  Strength: Within functional limits  Coordination: Within functional limits  Tone: Normal  Sensation: Intact  ADL  Feeding: Independent  Grooming: Stand by assistance  Grooming Skilled Clinical Factors: to stand at sink for grooming following toileting  UE Bathing: Independent  LE Bathing: Minimal assistance  UE Dressing: Independent  LE Dressing: Minimal assistance  Toileting: Stand by assistance  Additional Comments: B thigh high stockings applied and pt ed on wearing schedule and use of EZ slide. Pt ed on importance of completing ADL tasks as independently as possible to promote ROM/strength in knee during normal movements/daily tasks.  Pt verb good understanding of all ed provided     Activity Tolerance  Activity Tolerance: Patient tolerated treatment well  Bed mobility  Supine to Sit: Stand by assistance  Sit to Supine: Stand by assistance  Scooting: Stand by assistance  Transfers  Sit to stand: Contact guard assistance  Stand to sit: Contact guard assistance  Transfer Comments: pt cued on RW safety with functional transfers/ sit<>stands with reaching back, pushing up from surface. Pt improving tech with education as pt originally wanting to put walker to side when approaching toilet, bed, sink  Vision  Vision: Impaired  Vision Exceptions: Wears glasses at all times  Hearing  Hearing: Within functional limits  Cognition  Overall Cognitive Status: WNL  Orientation  Overall Orientation Status: Within Normal Limits  Perception  Overall Perceptual Status: WFL               Education Given To: Patient  Education Provided: Role of Therapy;Plan of Care;Equipment;Precautions; ADL Adaptive Strategies;Transfer Training; Fall Prevention Strategies  Barriers to Learning: None  Education Outcome: Verbalized understanding;Demonstrated understanding              AM-PAC Score        AM-PAC Inpatient Daily Activity Raw Score: 21 (01/04/23 1216)  AM-PAC Inpatient ADL T-Scale Score : 44.27 (01/04/23 1216)  ADL Inpatient CMS 0-100% Score: 32.79 (01/04/23 1216)  ADL Inpatient CMS G-Code Modifier : CJ (01/04/23 1216)    Tinneti Score       Goals  Short Term Goals  Time Frame for Short Term Goals: by discharge, pt will  Short Term Goal 1: demo SBA/S with ADL transfers with AD/DME as needed and good safety  Short Term Goal 2: demo SBA/S with functional mob in room with good safety, pacing for ADL Completion  Short Term Goal 3: demo SBA with LB AdLs except hose with AE/DME as needed and good safety  Short Term Goal 4: demo and verb good understanding of ed provided on ADL tecsh, TKA precautions, fall prevention, equip needs, and d/c recommendations  Patient Goals   Patient goals : to go home       Therapy Time   Individual Concurrent Group Co-treatment   Time In  0846         Time Out  0941         Minutes  55             Treatment min: [address], OT

## 2023-01-04 NOTE — PLAN OF CARE
Problem: Chronic Conditions and Co-morbidities  Goal: Patient's chronic conditions and co-morbidity symptoms are monitored and maintained or improved  1/4/2023 1007 by Adri Leija RN  Outcome: Progressing  Flowsheets (Taken 1/4/2023 0819)  Care Plan - Patient's Chronic Conditions and Co-Morbidity Symptoms are Monitored and Maintained or Improved: Monitor and assess patient's chronic conditions and comorbid symptoms for stability, deterioration, or improvement  1/3/2023 2246 by Alysia Landers RN  Outcome: Progressing     Problem: Discharge Planning  Goal: Discharge to home or other facility with appropriate resources  1/4/2023 1007 by Adri Leija RN  Outcome: Progressing  Flowsheets (Taken 1/4/2023 4943)  Discharge to home or other facility with appropriate resources: Identify barriers to discharge with patient and caregiver  1/3/2023 2246 by Alysia Landers RN  Outcome: Progressing     Problem: Pain  Goal: Verbalizes/displays adequate comfort level or baseline comfort level  1/4/2023 1007 by Adri Leija RN  Outcome: Progressing  1/3/2023 2246 by Alysia Landers RN  Outcome: Progressing     Problem: ABCDS Injury Assessment  Goal: Absence of physical injury  1/4/2023 1007 by Adri Leija RN  Outcome: Progressing  1/3/2023 2246 by Alysia Landers RN  Outcome: Progressing     Problem: Safety - Adult  Goal: Free from fall injury  1/4/2023 1007 by Adri Leija RN  Outcome: Progressing  1/3/2023 2246 by Alysia Landers RN  Outcome: Progressing     Problem: Neurosensory - Adult  Goal: Achieves stable or improved neurological status  Outcome: Progressing     Problem: Musculoskeletal - Adult  Goal: Return mobility to safest level of function  Outcome: Progressing     Problem: Gastrointestinal - Adult  Goal: Minimal or absence of nausea and vomiting  Outcome: Progressing

## 2023-01-04 NOTE — PROGRESS NOTES
Patient admitted to unit, oriented to room, bed mechanics, and call light. Bed is in lowest position, wheels are locked, and bed alarm is on. Gripper socks applied. Patient educated to call staff if needing assistance getting out of bed. Orders are reviewed and assessment completed. Call light is in reach. Will continue to monitor. Patient c/o slight tingling in left leg, she states she \"can feel it\" and requests to walk to restroom. Pt assisted to restroom with walker and stand by assist, tolerated well. Voided large amount into toilet. Patient now resting in bed, IS given and instructed, ice pack for pain. Call light in reach, bed alarm on.

## 2023-01-04 NOTE — CARE COORDINATION
DC Planning    Pt will be going home with spouse lives in trilevel home-she will be staying on 3rd level. Has walker. Plan for OPPT 1/9. Has f/u with 1/17. Denies any dc needs.

## 2023-01-04 NOTE — PLAN OF CARE
Problem: Chronic Conditions and Co-morbidities  Goal: Patient's chronic conditions and co-morbidity symptoms are monitored and maintained or improved  1/4/2023 1237 by Lc Muller RN  Outcome: Completed  1/4/2023 1007 by Lc Muller RN  Outcome: Progressing  Flowsheets (Taken 1/4/2023 4914)  Care Plan - Patient's Chronic Conditions and Co-Morbidity Symptoms are Monitored and Maintained or Improved: Monitor and assess patient's chronic conditions and comorbid symptoms for stability, deterioration, or improvement  1/3/2023 2246 by Leigh Ann Lawrence RN  Outcome: Progressing     Problem: Discharge Planning  Goal: Discharge to home or other facility with appropriate resources  1/4/2023 1237 by Lc Muller RN  Outcome: Completed  1/4/2023 1007 by Lc Muller RN  Outcome: Progressing  Flowsheets (Taken 1/4/2023 4217)  Discharge to home or other facility with appropriate resources: Identify barriers to discharge with patient and caregiver  1/3/2023 2246 by Leigh Ann Lawrence RN  Outcome: Progressing     Problem: Pain  Goal: Verbalizes/displays adequate comfort level or baseline comfort level  1/4/2023 1237 by Lc Muller RN  Outcome: Completed  1/4/2023 1007 by Lc Muller RN  Outcome: Progressing  1/3/2023 2246 by Leigh Ann Lawrence RN  Outcome: Progressing     Problem: ABCDS Injury Assessment  Goal: Absence of physical injury  1/4/2023 1237 by Lc Muller RN  Outcome: Completed  1/4/2023 1007 by Lc Muller RN  Outcome: Progressing  1/3/2023 2246 by Leigh Ann Lawrence RN  Outcome: Progressing     Problem: Safety - Adult  Goal: Free from fall injury  1/4/2023 1237 by Lc Muller RN  Outcome: Completed  1/4/2023 1007 by Lc Muller RN  Outcome: Progressing  1/3/2023 2246 by Leigh Ann Lawrence RN  Outcome: Progressing     Problem: Neurosensory - Adult  Goal: Achieves stable or improved neurological status  1/4/2023 1237 by Lc Muller RN  Outcome: Completed  1/4/2023 1007 by Lc Muller RN  Outcome: Progressing Problem: Musculoskeletal - Adult  Goal: Return mobility to safest level of function  1/4/2023 1237 by Jose Damon RN  Outcome: Completed  1/4/2023 1007 by Jose Damon RN  Outcome: Progressing     Problem: Gastrointestinal - Adult  Goal: Minimal or absence of nausea and vomiting  1/4/2023 1237 by Jose Damon RN  Outcome: Completed  1/4/2023 1007 by Jose Damon RN  Outcome: Progressing

## 2023-01-05 NOTE — DISCHARGE SUMMARY
Orthopedic  Discharge Summary         Patient Identification:  Nasrin Anderson is a 50 y.o. female. :  1970  MRN: [id number]     Acct: [de-identified]   Admit Date:  1/3/2023  Discharge date and time: 2023 12:39 PM     Attending Provider: No att. providers found                                     Reason for Admission: Revision left total knee arthroplasty    Discharge Diagnoses:   Patient Active Problem List   Diagnosis    S/P left knee arthroscopy    History of total knee arthroplasty, left    Type 2 diabetes mellitus without complication, without long-term current use of insulin ([facility] [address]    Essential hypertension    Gastroesophageal reflux disease without esophagitis    Closed fracture of left proximal humerus    S/P revision of total knee, left        Consults:  None    Procedures: None     Hospital Course:   Nasrin Anderson is a 50 y.o. female who underwent revision left total knee arthroplasty . We discussed DC with patient and she is ok with DC. All questions and concerns were addressed at this time. Laboratory parameters were followed and optimized when possible. Time spend on discharge discussion and plannin minutes    Disposition:   Home     Discharged Condition:  Stable     Discharge Medications:         Medication List        START taking these medications      aspirin EC 81 MG EC tablet  Take 1 tablet by mouth 2 times daily     docusate sodium 100 MG capsule  Commonly known as: Colace  Take 1 capsule by mouth 2 times daily as needed for Constipation     oxyCODONE-acetaminophen 5-325 MG per tablet  Commonly known as: Percocet  Take 1-2 tablets by mouth every 6 hours as needed for Pain for up to 7 days.  Max Daily Amount: 8 tablets            CONTINUE taking these medications      citalopram 40 MG tablet  Commonly known as: CELEXA     glimepiride 1 MG tablet  Commonly known as: AMARYL     HAIR SKIN AND NAILS FORMULA PO     metoprolol tartrate 25 MG tablet  Commonly known as: LOPRESSOR     omeprazole 20 MG delayed release capsule  Commonly known as: PRILOSEC     VITAMIN C PO               Where to Get Your Medications        These medications were sent to [facility] [address] [facility], [address] [facility] [phone] Leonides Barbour [phone]  [address] [place] [address], [address] [address]      Phone: [phone]   aspirin EC 81 MG EC tablet  docusate sodium 100 MG capsule  oxyCODONE-acetaminophen 5-325 MG per tablet         Discharge Instructions: Follow up with Carlos Alberto Velasco MD in 3-4 weeks.      Follow-up Dr. Shreya Pedroza 10-14 days after surgery    Hospital acquired Infections: None       Екатерина Simmons DO   Orthopedic Surgery Resident  [address] [place] [address]

## 2023-01-06 LAB — SURGICAL PATHOLOGY REPORT: NORMAL

## 2023-01-08 LAB
CULTURE: NORMAL
DIRECT EXAM: NORMAL
SPECIMEN DESCRIPTION: NORMAL

## 2023-01-09 ENCOUNTER — HOSPITAL ENCOUNTER (OUTPATIENT)
Dept: PHYSICAL THERAPY | Facility: CLINIC | Age: 53
Setting detail: THERAPIES SERIES
Discharge: HOME | End: 2023-01-09
Payer: COMMERCIAL

## 2023-01-09 DIAGNOSIS — G89.18 POST-OP PAIN: ICD-10-CM

## 2023-01-09 PROCEDURE — 97016 VASOPNEUMATIC DEVICE THERAPY: CPT

## 2023-01-09 PROCEDURE — 97161 PT EVAL LOW COMPLEX 20 MIN: CPT

## 2023-01-09 PROCEDURE — 97110 THERAPEUTIC EXERCISES: CPT

## 2023-01-09 RX ORDER — OXYCODONE HYDROCHLORIDE AND ACETAMINOPHEN 5; 325 MG/1; MG/1
1-2 TABLET ORAL EVERY 6 HOURS PRN
Qty: 56 TABLET | Refills: 0 | OUTPATIENT
Start: 2023-01-09 | End: 2023-01-16

## 2023-01-09 RX ORDER — OXYCODONE HYDROCHLORIDE AND ACETAMINOPHEN 5; 325 MG/1; MG/1
1 TABLET ORAL EVERY 6 HOURS PRN
Qty: 28 TABLET | Refills: 0 | Status: SHIPPED | OUTPATIENT
Start: 2023-01-09 | End: 2023-01-16

## 2023-01-09 NOTE — TELEPHONE ENCOUNTER
Patient called and requested a prescription refill oxyCODONE-acetaminophen (PERCOCET) 5-325 MG to be sent to [facility] in [facility]. LRF:  01/03/23  #56    S/P Left knee total arthroplasty revision DOS 01/03/23    Patient may be reached at [phone] if needed. Thank you.

## 2023-01-09 NOTE — CONSULTS
[x] [facility]  Outpatient Rehabilitation &  Therapy  [facility]   [place]: [phone]  F: [phone]        Physical Therapy Lower Extremity Evaluation    Date:  2023  Patient: Desi Rogers   : 1970  MRN: [id number]  Physician: Dr Karen Tinajero: Keisha (15 remain of 25, hard max, benefit period  to 3-31)  Medical Diagnosis: LLE TKA Revision  Rehab Codes: K43.183, M62.81 (Muscle Weakness), M62.9 (Disorder of Muscle), M79.1 (Myalgia), Z73.6 (Limitation of ADLs)   Onset date: 1-3-23  Next 's appt. : 23        Subjective:   CC/HPI: Pt is a 50year old female with history of LLE TKA, underwent revision of the TKA due to pain/possible metal hypersensitivity. Pt had revision of the TKA on 1-3-23 at [address] [place] [address], overnight stay. Went home from there, getting around at home ok currently. She had a hypersensitivity plate put in. Today she arrives with a rolling walker.        PMHx: [] Unremarkable [] Diabetes [] HTN  [] Pacemaker   [] MI/Heart Problems [] Cancer [] Arthritis   [] Other:              [x] Refer to full medical chart  In EPIC       Medications:  [x] Refer to full medical record [] None [] Other:  Allergies:       [x] Refer to full medical record [] None [] Other:    ADL/IADL [x] Previously independent with all [] Currently independent with all Who currently assists the patient with task     [] Previously independent with all except: [x] Currently independent with all except:     Bathing  [] Assist [] Assist     Dress/grooming [] Assist [] Assist     Transfer/mobility [] Assist [] Assist     Feeding [] Assist [] Assist     Toileting [] Assist [] Assist     Driving [] Assist [x] Assist     Housekeeping [] Assist [x] Assist     Grocery shop/meal prep [] Assist [x] Assist          Gait Prior level of function Current level of function    [x] Independent  [] Assist [] Independent  [] Assist   Device: [] Independent [] Independent    [] Straight Cane [] Quad cane [] Straight Cane [] Quad cane    [] Standard walker [] Rolling walker   [] 4 wheeled walker [] Standard walker [] Rolling walker   [] 4 wheeled walker    [] Wheelchair [x] Rolling walker        Marital Status , helping at home    Home type 1   Stairs from outside 6   Stairs inside -   Employment [address] status Off currently    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  yes   Location LLE knee   Pain Rating currently 4/10   Pain at worse 10/10   Pain at best 4/10   Description of pain sharp   Altered Sensation Incisional area LLE knee   What makes it worse Standing, walking   What makes it better Rest, elevation, ice   Symptom progression Same since post-op         Objective:    STRENGTH    Left Right   Hip Flex 4-    Ext 4-    ABD 4    ADD 4    Knee Flex 4-    Ext 4-    Ankle DF 5    PF 5    INV 5    EVER 5             ROM  ° A/P    Left Right   Knee Flex 84/ 90    Ext 8/5             Edema Left  Right    Knee:      Suprapatellar   54 cm    Midpatellar  54.5 cm               OBSERVATION No Deficit Deficit Not Tested Comments   Posture       Forward Head [] [x] []    Rounded Shoulders [] [x] []    Genu Valgus [] [x] []    Genu Varus [] [] []    Slumped Sitting [] [] []    Palpation [] [x] []    Sensation [] [x] []    Patellar Mobility [] [x] [] LLE limited all planes    Gait [] [] [] Analysis: Decreased stance on LLE, heel/toe decreased on LLE, decreased stride length          FUNCTION Normal Difficult Unable   Sitting [x] [] []   Standing [] [x] []   Ambulation [] [x] []   Lift/Carry [] [x] []   Stairs [] [x] []   Bending [] [x] []   Squat [] [x] []      Flexibility Normal Left tight Right tight   Hip flexor [] [x] []   HS [] [x] []   gastroc [] [x] []    [] [] []          Functional Test: KOOS Score: 68% functionally impaired       Assessment:    Patient would benefit from skilled physical therapy services in order to improve strenght, ROM, flexibility and improve her gait and functional mobility to return to normal ADLs and work     Problems:    [x] ? Pain  [x] ? ROM  [x] ? Strength  [x] ? Function        Goals  MET NOT MET ON-  GOING  Details   Date Addressed:       STG: To be met in 10 treatments           1. ? Pain: Decrease pain levels to 4/10 with ADLs []  []  []      2. ? ROM: Increase LLE flexibility and AROM limitations throughout to equal bilat to reduce difficulty with ADLs []  []  []      3. ? Strength: Increase LLE MMT to 5/5 throughout to ease functional limitations and mobility  []  []  []     4. Independent with Home Exercise Programs []  []  []     5. Patient to ambulate without assistive device in her home  []  []  []      []  []  []     Date Addressed:        LTG: To be met in 20 treatments       1. Improve score on assessment tool KOOS from 68% impairment to less than 20% impairment  []  []  []     2. Reduce pain levels to 2/10 or less with ADLs []  []  []      []  []  []                Patient goals: decrease pain with activity, return to work    Rehab Potential:  [x] Good  [] Fair  [] Poor   Suggested Professional Referral:  [x] No  [] Yes:  Barriers to Goal Achievement[de-identified]  [x] No  [] Yes:  Domestic Concerns:  [x] No  [] Yes:    Pt. Education:  [x] Plans/Goals, Risks/Benefits discussed  [x] Home exercise program    Method of Education: [x] Verbal  [x] Demo  [x] Written  Comprehension of Education:  [x] Verbalizes understanding. [x] Demonstrates understanding. [x] Needs Review. [] Demonstrates/verbalizes understanding of HEP/Ed previously given.     Treatment Plan:  [x] Therapeutic Exercise    [] Aquatic Therapy   [x] Manual Therapy     [] Electrical Stimulation  [] Instruction in HEP      [] Lumbar/Cervical Traction  [] Neuromuscular Re-education [] Cold/hotpack  [] Iontophoresis: 4 mg/mL  [] Vasocompression (GameReady)                    Dexamethasone Sodium  [] Gait Training             Phosphate 40-80 mAmin         []  Medication allergies reviewed for use of    Dexamethasone Sodium Phosphate 4mg/ml     with iontophoresis treatments. Pt is not allergic. Frequency:  2 x/week for 20 visits      Todays Treatment:    Access Code: [email or web address]  URL: [email or web address]  Date: 01/09/2023  Prepared by: Prasanna Rain    Exercises  Supine Ankle Pumps - 1 x daily - 7 x weekly - 3 sets - 10 reps  Long Sitting Quad Set - 1 x daily - 7 x weekly - 3 sets - 10 reps - 10 (sec) hold  Supine Heel Slide with Strap - 1 x daily - 7 x weekly - 3 sets - 10 reps  Small Range Straight Leg Raise - 1 x daily - 7 x weekly - 3 sets - 10 reps  Seated Knee Flexion Extension AAROM with Overpressure - 1 x daily - 7 x weekly - 3 sets - 10 reps - 10 (sec) hold  Long Sitting Calf Stretch with Strap - 1 x daily - 7 x weekly - 3 sets - 30 (sec) hold  Seated Table Hamstring Stretch - 1 x daily - 7 x weekly - 3 sets - 30 (sec) hold  Supine Knee Extension Strengthening - 1 x daily - 7 x weekly - 3 sets - 10 reps      Exercise    LLE TKA Knee   DOS 1-3-23 Reps/ Time Weight/ Level Comments         Nustep 10'           Calf stretch Stretch       Hamstring Stretch             Quad Set       SLR       Heel Slides       Clamshells       Bridges       Sidelying hip abduction             4-way hip Band       Band TKE       Total Gym Squats       Total Gym heel raises       Balance Board                                              Specific Instructions for next treatment: advance as tolerated for TKA       Evaluation Complexity:  History (Personal factors, comorbidities) [x] 0 [] 1-2 [] 3+   Exam (limitations, restrictions) [x] 1-2 [] 3 [] 4+   Clinical presentation (progression) [x] Stable [] Evolving  [] Unstable   Decision Making [x] Low [] Moderate [] High    [x] Low Complexity [] Moderate Complexity [] High Complexity       Treatment Charges: Mins Units   [x] Evaluation       [x]  Low       []  Moderate       []  High 30 1   [x]  Ther Exercise 15 1   []  Manual Therapy     [x] Vasocompression 15 1   []  Gait     []        TOTAL TREATMENT TIME: 60    Time in:1000   Time Out:1100      Electronically signed by: Gilles Jimenez PT        Physician Signature:________________________________Date:__________________  By signing above or cosigning this note, I have reviewed this plan of care and certify a need for medically necessary rehabilitation services.      *PLEASE SIGN ABOVE AND FAX BACK ALL PAGES*

## 2023-01-09 NOTE — TELEPHONE ENCOUNTER
Dr. Julianne Alcaraz,     L Knee TKA Revision 1/3/23    I spoke with the patient and she states that taking the pain medication every 6 hours does not help. She says when she takes it, it takes about an hour to catch up and she wanted to know if you would change instructions to every 4 hours. She feels this would better help manage her pain. She asked was there anything additional she could take, and I advised she can take Tylenol but to be aware of the 3000 mg max in a day and that the pain medication already carries 325 mg. She understood.

## 2023-01-09 NOTE — TELEPHONE ENCOUNTER
Spoke with patient, and made aware of change. She stated understanding and had no further questions.

## 2023-01-11 ENCOUNTER — HOSPITAL ENCOUNTER (OUTPATIENT)
Dept: PHYSICAL THERAPY | Facility: CLINIC | Age: 53
Setting detail: THERAPIES SERIES
Discharge: HOME | End: 2023-01-11
Payer: COMMERCIAL

## 2023-01-11 PROCEDURE — 97016 VASOPNEUMATIC DEVICE THERAPY: CPT

## 2023-01-11 PROCEDURE — 97110 THERAPEUTIC EXERCISES: CPT

## 2023-01-11 NOTE — FLOWSHEET NOTE
[] [facility] &  Therapy  [address]  [phone]  F: [phone] [] [address]  [address]   [address]  P: [phone]  F: [phone] [] [address]  [address] [facility]  P: [phone]  F: [phone] [] [address] [facility]  P: [phone]  F: [phone] [x] [address]  [facility]   [address]   [place]: [phone]  F: [phone]      Physical Therapy Daily Treatment Note    Date:  2023  Patient Name:  Jone Bose    :  1970  MRN: [id number]  Physician: Dr Nix Sumit: Keisha (15 , hard max, benefit period  to 3-31)  Medical Diagnosis: LLE TKA Revision        Rehab Codes: P71.057, M62.81 (Muscle Weakness), M62.9 (Disorder of Muscle), M79.1 (Myalgia), Z73.6 (Limitation of ADLs)   Onset date: 1-3-23                 Next 's appt. : 23  Visit# / total visits:    Cancels/No Shows: 0    Subjective:    Pain:  [x] Yes  [] No Location: L knee  Pain Rating: (0-10 scale) 4/10  Pain altered Tx:  [] No  [] Yes  Action:  Comments: Rates moderate pain today in her knee. Has been working on [employer], reports most difficulty with heel slides.      Objective:  Exercise     LLE TKA Knee   DOS 1-3-23 Reps/ Time Weight/ Level Comments             Nustep 10'                 Calf stretch Stretch   3x30\"       Hamstring Stretch   3x30\"                 Quad Set   20x5\"    long sitting   SLR   x12       Heel Slides   x20  A     Clamshells          Bridges          Sidelying hip abduction                Standing        Marching   x20       4-way hip Band   x10 Orange      Band TKE  10x10\" Lime     Total Gym Squats   x20  L20     Total Gym heel raises   x20 L20     Balance Board                                                                   Other: vasocompression x10 minutes 34 degrees moderate compression supine          L knee ROM  ° A/P     1/11/23    Knee Flex 110 A    Ext                  Specific Instructions for next treatment: advance as tolerated for TKA          Treatment Charges: Mins Units   []  Modalities     [x]  Ther Exercise 40 3   []  Manual Therapy     []  Ther Activities     []  Aquatics     [x]  Vasocompression 10 1   []  Other     Total Treatment time 50 4       Assessment: [x] Progressing toward goals. Progressed program with addition of standing exercises. Patient with significant improvement in her knee ROM this date, improving to 110 degrees flexion measured on the total gym machine. Completed quad setting in long sitting due to decrease quad activation with supine completion. Improved SLR to follow. [] No change. [] Other:  [x] Patient would continue to benefit from skilled physical therapy services in order to:  improve strenght, ROM, flexibility and improve her gait and functional mobility to return to normal ADLs and work     STG/LTG  Goals  MET NOT MET ON-  GOING  Details   Date Addressed:           STG: To be met in 10 treatments            1. ? Pain: Decrease pain levels to 4/10 with ADLs []  []  []      2. ? ROM: Increase LLE flexibility and AROM limitations throughout to equal bilat to reduce difficulty with ADLs []  []  []      3. ? Strength: Increase LLE MMT to 5/5 throughout to ease functional limitations and mobility  []  []  []      4. Independent with Home Exercise Programs []  []  []      5. Patient to ambulate without assistive device in her home  []  []  []        []  []  []      Date Addressed:            LTG: To be met in 20 treatments           1. Improve score on assessment tool KOOS from 68% impairment to less than 20% impairment  []  []  []      2.  Reduce pain levels to 2/10 or less with ADLs []  []  [] []  []  []                        Patient goals: decrease pain with activity, return to work         Pt. Education:  [x] Yes  [] No  [] Reviewed Prior HEP/Ed  Method of Education: [] Verbal  [] Demo  [] Written  Comprehension of Education:  [x] Verbalizes understanding. [x] Demonstrates understanding. [x] Needs review. [] Demonstrates/verbalizes HEP/Ed previously given. Plan: [x] Continue current frequency toward long and short term goals.     [x] Specific Instructions for subsequent treatments: see above       Time In:5:30p            Time Out: 6:30p    Electronically signed by:  Woodrow Petersen, PT

## 2023-01-17 ENCOUNTER — HOSPITAL ENCOUNTER (OUTPATIENT)
Dept: PHYSICAL THERAPY | Facility: CLINIC | Age: 53
Setting detail: THERAPIES SERIES
Discharge: HOME | End: 2023-01-17
Payer: COMMERCIAL

## 2023-01-17 ENCOUNTER — OFFICE VISIT (OUTPATIENT)
Dept: ORTHOPEDIC SURGERY | Age: 53
End: 2023-01-17

## 2023-01-17 VITALS — BODY MASS INDEX: 40.15 KG/M2 | WEIGHT: 241 LBS | HEIGHT: 65 IN | RESPIRATION RATE: 14 BRPM

## 2023-01-17 DIAGNOSIS — Z96.652 STATUS POST REVISION OF TOTAL REPLACEMENT OF LEFT KNEE: Primary | ICD-10-CM

## 2023-01-17 PROCEDURE — 99024 POSTOP FOLLOW-UP VISIT: CPT | Performed by: PHYSICIAN ASSISTANT

## 2023-01-17 PROCEDURE — 97110 THERAPEUTIC EXERCISES: CPT

## 2023-01-17 PROCEDURE — 97016 VASOPNEUMATIC DEVICE THERAPY: CPT

## 2023-01-17 RX ORDER — OXYCODONE HYDROCHLORIDE AND ACETAMINOPHEN 5; 325 MG/1; MG/1
1 TABLET ORAL EVERY 6 HOURS PRN
Qty: 28 TABLET | Refills: 0 | Status: SHIPPED | OUTPATIENT
Start: 2023-01-17 | End: 2023-01-24

## 2023-01-17 ASSESSMENT — ENCOUNTER SYMPTOMS
COLOR CHANGE: 0
COUGH: 0
SHORTNESS OF BREATH: 0
VOMITING: 0

## 2023-01-17 NOTE — PROGRESS NOTES
[facility] [employer] [facility]  [address] [address] [place] [address] [place]  [address] [address]  Dept: [phone]  Dept Fax: [phone]        Postoperative follow-up note    Subjective:   Amanda Diehl is a 50y.o. year old female who presents to our office today for postoperative followup regarding her   1. Status post revision of total replacement of left knee        Chief Complaint   Patient presents with    Post-Op Check     L TKA revision 1/3/23       Date of Surgery: 1/3/2023    Amanda Diehl  is a 50y.o. year old female who presents to our office today for postoperative follow up after having undergone a left total knee arthroplasty revision on 1/3/2023 completed by Dr. Natalia Scott DO. The patient denies fevers, chills, nausea, vomiting, diarrhea. The patient has started physical therapy. Patient no significant improvement in her left knee discomfort after the surgery. She notes that she is taking Percocet prior to physical therapy but otherwise she is taking Tylenol for her discomfort. She notes that she is doing very well in physical therapy and her knee feels significantly better when compared to her prior postoperative time. She denies numbness and or tingling in the left lower extremity. She denies calf pain. She notes that she is taking 81 mg aspirin twice daily for DVT prophylaxis. Review of Systems   Constitutional:  Negative for activity change and fever. HENT:  Negative for sneezing. Respiratory:  Negative for cough and shortness of breath. Cardiovascular:  Negative for chest pain. Gastrointestinal:  Negative for vomiting. Musculoskeletal:  Positive for arthralgias (left knee). Negative for joint swelling and myalgias. Skin:  Negative for color change. Neurological:  Negative for weakness and numbness. Psychiatric/Behavioral:  Negative for sleep disturbance.           Objective :   General: Nacho Calderon is a 50 y.o. female who is alert and oriented and sitting comfortably in our office. Ortho Exam  MS: The patient ambulates with a cane and a moderate limp noted to the left Lower extremity. The patient arrived with the surgical dressings intact. After removal of the dressings, evaluation of the Left knee reveals a healing incision with mild eschar and superficial skin glue intact. There is no erythema, ecchymosis, incision drainage, new skin lesions or signs of infection present on the anterior aspect of the Left knee. AROM Left knee: 0-100 . There is minimal lower extremity edema noted on the surgical leg. There is no tenderness palpation noted to the calf on the affected leg. Negative Homans' sign. Sensation is intact to light touch to the Left lower extremity without focal deficits present. DP pulse 2+, PT pulse 2+, BCR Left foot. Neuro: alert. oriented  Eyes: Extra-ocular muscles intact  Mouth: Oral mucosa moist. No perioral lesions  Pulm: Respirations unlabored and regular. Skin: warm, well perfused  Psych:   Patient has good fund of knowledge and displays understanging of exam, diagnosis, and plan. Radiology: XR KNEE LEFT (3 VIEWS)    Result Date: 1/17/2023  History: Status post  Left total knee arthroplasty revision. Comparison: 1/3/2023. Findings: AP, lateral, merchant view x-rays of the Left  knee done in the office standing today shows Left total knee arthroplasty revision with long stem components in good position without complications. No loosening of components is appreciated. No evidence of fracture, subluxation, dislocation, radiopaque foreign body, or radioopaque tumors noted. Alignment is near-anatomic. Impression: Status post Left total knee arthroplasty revision as described above.      XR KNEE LEFT (3 VIEWS)    Result Date: 1/3/2023  EXAMINATION: THREE XRAY VIEWS OF THE LEFT KNEE 1/3/2023 4:54 pm COMPARISON: 1 November 2022 HISTORY: [address] PROVIDED HISTORY: post op in PACU TECHNOLOGIST PROVIDED HISTORY: AP/Later/Merchant post op in PACU Reason for Exam: postop FINDINGS: There has been interval revision of an arthroplasty with current hardware of the long stem type. Alignment and position of the hardware is satisfactory. No hardware complication. No fracture. Expected soft tissue changes are noted including air in the soft tissues and edema. Interval revision of a left total knee arthroplasty without hardware complication. Expected postoperative soft tissue changes are noted. Assessment:      1. Status post revision of total replacement of left knee           Plan:      Sandra Bardales  is a 50 y.o. female that is currently 2 weeks postoperative after undergoing a left total knee arthroplasty revision with Dr. Melissa Gardner DO on 1/3/2023. I personally reviewed the x-ray images from today of the patient's left knee and compared them to post-operative pictures  from the 1/3/2023. The left knee hardware is in place with proper alignment and no signs of loosening. At this time I would like the patient to continue outpatient physical therapy working on range of motion restoration, lower extremity strengthening, balance and mobility and gait training over the next 4 weeks. The patient was instructed not to submerge her left lower extremity in any bodies of water until the surgical incisions are completely healed in approximately 4 weeks. The patient can shower but was instructed to keep the surgical incisions clean and dry otherwise. The patient may continue taking prescribed pain medication and beginning to decrease the number of doses per day and bridge with Ibuprofen or Tylenol as needed. She was given a refill of Percocet to take prior to bed and prior to physical therapy. She was instructed that we will begin tapering her off this medication over the next 4 weeks.   The patient will follow-up in 4 weeks or sooner if needed. The patient was instructed to call the office with any questions or concerns. The patient voiced their understanding. Follow up: Return in about 4 weeks (around 2/14/2023) for post-operative follow up. Orders Placed This Encounter   Medications    oxyCODONE-acetaminophen (PERCOCET) 5-325 MG per tablet     Sig: Take 1 tablet by mouth every 6 hours as needed for Pain for up to 7 days. Intended supply: 7 days. Take lowest dose possible to manage pain Max Daily Amount: 4 tablets     Dispense:  28 tablet     Refill:  0     Reduce doses taken as pain becomes manageable       Orders Placed This Encounter   Procedures    XR KNEE LEFT (3 VIEWS)     Standing Status:   Future     Number of Occurrences:   1     Standing Expiration Date:   1/17/2024       This note is created with the assistance of a speech recognition program.  While intending to generate a document that actually reflects the content of the visit, the document can still have some errors including those of syntax and sound a like substitutions which may escape proof reading. In such instances, actual meaning can be extrapolated by contextual diversion.      Electronically signed by Randee Aparicio PA-C on 1/17/2023 at 4:27 PM

## 2023-01-17 NOTE — FLOWSHEET NOTE
[] [facility]) [facility] &  Therapy  [address]  [phone]  F: [phone] [] [address]  [address]   [address]  P: [phone]  F: [phone] [] [address]  [address]  P: [phone]  F: [phone] [] [address]  P: [phone]  F: [phone] [x] [address]  [facility]   [address]   [place]: [phone]  F: [phone]      Physical Therapy Daily Treatment Note    Date:  2023  Patient Name:  Hyland Kussmaul    :  1970  MRN: [id number]  Physician: Dr Evelio Chaoeling: Keisha (15 , hard max, benefit period  to 3-31)  Medical Diagnosis: LLE TKA Revision        Rehab Codes: D51.731, M62.81 (Muscle Weakness), M62.9 (Disorder of Muscle), M79.1 (Myalgia), Z73.6 (Limitation of ADLs)   Onset date: 1-3-23                 Next 's appt. : 23  Visit# / total visits: 3/14   Cancels/No Shows: 0    Subjective:    Pain:  [x] Yes  [] No Location: L knee  Pain Rating: (0-10 scale) 2/10  Pain altered Tx:  [] No  [] Yes  Action:  Comments: Patient arrives with single point cane stating mild pain today, overall is feeling better.       Objective:  Exercise     LLE TKA Knee   DOS 1-3-23 Reps/ Time Weight/ Level Comments             Nustep 10'                 Calf stretch Stretch   3x30\"       Hamstring Stretch   3x30\"                 Quad Set   20x5\"    long sitting   SLR   x12       Heel Slides   x20  A     Clamshells          Bridges          Sidelying hip abduction                Standing        Marching   x20       4-way hip Band   x10 Orange      Band TKE  10x10\" Lime     Total Gym Squats   x20 L20     Total Gym heel raises   x20 L20     Balance Board  3' L2                                                               Other: vasocompression x10 minutes 34 degrees moderate compression supine          L knee ROM  ° A/P     1/17/23    Knee Flex 110 A    Ext 0 A                 Specific Instructions for next treatment: Advance as tolerated for TKA          Treatment Charges: Mins Units   []  Modalities     [x]  Ther Exercise 35 2   []  Manual Therapy     []  Ther Activities     []  Aquatics     [x]  Vasocompression 10 1   []  Other     Total Treatment time 45 3       Assessment: [x] Progressing toward goals. Improved ROM in L knee this date. Requires increased time to complete exercises. Fatigue noted with resisted bands. Ended with vaso to decrease swelling. Will continue to advance ROM and quad strength next visit. [] No change. [] Other:  [x] Patient would continue to benefit from skilled physical therapy services in order to:  improve strenght, ROM, flexibility and improve her gait and functional mobility to return to normal ADLs and work     STG/LTG  Goals  MET NOT MET ON-  GOING  Details   Date Addressed:           STG: To be met in 10 treatments            1. ? Pain: Decrease pain levels to 4/10 with ADLs []  []  []      2. ? ROM: Increase LLE flexibility and AROM limitations throughout to equal bilat to reduce difficulty with ADLs []  []  []      3. ? Strength: Increase LLE MMT to 5/5 throughout to ease functional limitations and mobility  []  []  []      4. Independent with Home Exercise Programs []  []  []      5. Patient to ambulate without assistive device in her home  []  []  []        []  []  []      Date Addressed:            LTG: To be met in 20 treatments           1. Improve score on assessment tool KOOS from 68% impairment to less than 20% impairment  []  []  []      2.  Reduce pain levels to 2/10 or less with ADLs []  []  []        []  []  []                        Patient goals: decrease pain with activity, return to work Pt. Education:  [x] Yes  [] No  [] Reviewed Prior HEP/Ed  Method of Education: [] Verbal  [] Demo  [] Written  Comprehension of Education:  [x] Verbalizes understanding. [x] Demonstrates understanding. [x] Needs review. [] Demonstrates/verbalizes HEP/Ed previously given. Plan: [x] Continue current frequency toward long and short term goals.     [x] Specific Instructions for subsequent treatments: see above       Time In: 5:10pm            Time Out: 6:05pm     Electronically signed by:  Jasper Lyon PTA

## 2023-01-19 ENCOUNTER — HOSPITAL ENCOUNTER (OUTPATIENT)
Dept: PHYSICAL THERAPY | Facility: CLINIC | Age: 53
Setting detail: THERAPIES SERIES
Discharge: HOME | End: 2023-01-19
Payer: COMMERCIAL

## 2023-01-19 PROCEDURE — 97016 VASOPNEUMATIC DEVICE THERAPY: CPT

## 2023-01-19 PROCEDURE — 97110 THERAPEUTIC EXERCISES: CPT

## 2023-01-19 NOTE — FLOWSHEET NOTE
[] [facility]  Outpatient Rehabilitation &  Therapy  [address]  [phone]  F: [phone] [] [facility]  Outpatient Rehabilitation &  Therapy  [address] [facility] [address]   [address]  P: [phone]  F: [phone] [] [facility]  Outpatient Rehabilitation &  Therapy  [address]  P: [phone]  F: [phone] [] [facility]  Outpatient Rehabilitation &  Therapy  [address]  P: [phone]  F: [phone] [x] [facility]  Outpatient Rehabilitation &  Therapy  [address]   [address]   P: [phone]  F: [phone]      Physical Therapy Daily Treatment Note    Date:  2023  Patient Name:  Kat Akbar    :  1970  MRN: [id number]  Physician: Dr Elena                                        Insurance: [facility] (14 remain , hard max, benefit period  to 3-31)  Medical Diagnosis: LLE TKA Revision        Rehab Codes: Z96.652, M62.81 (Muscle Weakness), M62.9 (Disorder of Muscle), M79.1 (Myalgia), Z73.6 (Limitation of ADLs)   Onset date: 1-3-23                 Next 's appt.: 23  Visit# / total visits:    Cancels/No Shows: 0    Subjective:    Pain:  [x] Yes  [] No Location: L knee  Pain Rating: (0-10 scale) 3/10  Pain altered Tx:  [x] No  [] Yes  Action:  Comments: Patient arrives without an assistive device stating she has mild pain and is hoping she can get cleared to go back to work on 23 because she can work from home.     Objective:  Exercise     LLE TKA Knee   DOS 1-3-23 Reps/ Time Weight/ Level Comments             Bike 10'                 Calf stretch Stretch   3x30\"       Hamstring Stretch   3x30\"       Knee flexion stool stretch  3x30\"               SLR   2x10       Heel Slides   x20  A     Clamshells          Bridges          Sidelying hip abduction                Gym       4-way hip Band   x10 Lime     Band TKE  10x10\" Lime     Total Gym Squats   x20 L20    Total Gym heel raises   x20 L20     Balance Board  5' L2           Parallel bars      Lunges  x10 bilat     Step ups   x10 6\"                                            Other: Vasocompression x10 minutes 34 degrees moderate compression supine          L knee ROM  ° A/P     1/17/23    Knee Flex 115 A    Ext 0 A                 Specific Instructions for next treatment: Advance as tolerated for TKA          Treatment Charges: Mins Units   []  Modalities     [x]  Ther Exercise 30 2   []  Manual Therapy     []  Ther Activities     []  Aquatics     [x]  Vasocompression 10 1   []  Other     Total Treatment time 40 3       Assessment: [x] Progressing toward goals. Improved ROM in L knee gaining 5 degrees in flexion since last visit. Advanced LE strength exercises with no pain but fatigue noted. Ended with vaso per patient request. Will continue to monitor symptoms and progress next visit. [] No change. [] Other:  [x] Patient would continue to benefit from skilled physical therapy services in order to:  improve strenght, ROM, flexibility and improve her gait and functional mobility to return to normal ADLs and work     STG/LTG  Goals  MET NOT MET ON-  GOING  Details   Date Addressed:           STG: To be met in 10 treatments            1. ? Pain: Decrease pain levels to 4/10 with ADLs []  []  []      2. ? ROM: Increase LLE flexibility and AROM limitations throughout to equal bilat to reduce difficulty with ADLs []  []  []      3. ? Strength: Increase LLE MMT to 5/5 throughout to ease functional limitations and mobility  []  []  []      4. Independent with Home Exercise Programs []  []  []      5. Patient to ambulate without assistive device in her home  []  []  []        []  []  []      Date Addressed:            LTG: To be met in 20 treatments           1. Improve score on assessment tool KOOS from 68% impairment to less than 20% impairment  []  []  []      2.  Reduce pain levels to 2/10 or less with ADLs []  [] []        []  []  []                        Patient goals: decrease pain with activity, return to work         Pt. Education:  [x] Yes  [] No  [] Reviewed Prior HEP/Ed  Method of Education: [] Verbal  [] Demo  [] Written  Comprehension of Education:  [x] Verbalizes understanding. [x] Demonstrates understanding. [x] Needs review. [] Demonstrates/verbalizes HEP/Ed previously given. Plan: [x] Continue current frequency toward long and short term goals.         Time In: 5:15pm            Time Out: 6:05pm    Electronically signed by:  Kang Miller PTA

## 2023-01-23 ENCOUNTER — APPOINTMENT (OUTPATIENT)
Dept: PHYSICAL THERAPY | Facility: CLINIC | Age: 53
End: 2023-01-23
Payer: COMMERCIAL

## 2023-01-24 ENCOUNTER — HOSPITAL ENCOUNTER (OUTPATIENT)
Dept: PHYSICAL THERAPY | Facility: CLINIC | Age: 53
Setting detail: THERAPIES SERIES
Discharge: HOME | End: 2023-01-24
Payer: COMMERCIAL

## 2023-01-24 PROCEDURE — 97110 THERAPEUTIC EXERCISES: CPT

## 2023-01-24 PROCEDURE — 97016 VASOPNEUMATIC DEVICE THERAPY: CPT

## 2023-01-24 NOTE — FLOWSHEET NOTE
[] [facility]) [facility] &  Therapy  [address]  [phone]  F: [phone] [] [address]  [place] [address]   [address]  P: [phone]  F: [phone] [] [address]  [address]  P: [phone]  F: [phone] [] [address] [facility]  P: [phone]  F: [phone] [x] [address]  [facility]   [address]   [place]: [phone]  F: [phone]      Physical Therapy Daily Treatment Note    Date:  2023  Patient Name:  Kayli Keene    :  1970  MRN: [id number]  Physician: Dr Juan M Silver: Keisha (15 , hard max, benefit period  to 3-31)  Medical Diagnosis: LLE TKA Revision        Rehab Codes: Y85.424, M62.81 (Muscle Weakness), M62.9 (Disorder of Muscle), M79.1 (Myalgia), Z73.6 (Limitation of ADLs)   Onset date: 1-3-23                 Next 's appt. : 2-15-23  Visit# / total visits:    Cancels/No Shows: 0    Subjective:    Pain:  [x] Yes  [] No Location: L knee  Pain Rating: (0-10 scale) 3/10  Pain altered Tx:  [x] No  [] Yes  Action:  Comments: Patient arrives using cane stating uses cane after therapy and in the morning, but able to go without cane throughout the day at home.      Objective:  Exercise     LLE TKA Knee   DOS 1-3-23 Reps/ Time Weight/ Level Comments             Bike 10'                 Calf stretch Stretch   3x30\"       Hamstring Stretch   3x30\"       Knee flexion stool stretch  3x30\"               SLR   2x10       Heel Slides   x20  A     Clamshells          Bridges   10x    added    Sidelying hip abduction                Gym       4-way hip Band   x10 Lime     Band TKE  10x10\" Lime  increased reps 1/24   Total Gym Squats   x20 L20   Total Gym heel raises   x20 L20     Balance Board  5' L2           Parallel bars      Lunges  x15 bilat  increased reps 1/24   Step ups   x15 6\"  increased reps 1/24                                          Other: Vasocompression x10 minutes 34 degrees moderate compression supine          L knee ROM  ° A/P     1/17/23    Knee Flex 115 A    Ext 0 A                 Specific Instructions for next treatment: Advance as tolerated for TKA          Treatment Charges: Mins Units   []  Modalities     [x]  Ther Exercise 35 2   []  Manual Therapy     []  Ther Activities     []  Aquatics     [x]  Vasocompression 10 1   []  Other     Total Treatment time 45 3       Assessment: [x] Progressing toward goals. Completed therapeutic exercise as above with good tolerance.  Able to increase reps and add bridges as above for increased strength without difficulty.    [] No change.     [] Other:  [x] Patient would continue to benefit from skilled physical therapy services in order to:  improve strenght, ROM, flexibility and improve her gait and functional mobility to return to normal ADLs and work     STG/LTG  Goals  MET NOT MET ON-  GOING  Details   Date Addressed:           STG: To be met in 10 treatments            1. ? Pain: Decrease pain levels to 4/10 with ADLs []  []  []      2. ? ROM: Increase LLE flexibility and AROM limitations throughout to equal bilat to reduce difficulty with ADLs []  []  []      3. ? Strength: Increase LLE MMT to 5/5 throughout to ease functional limitations and mobility  []  []  []      4. Independent with Home Exercise Programs []  []  []      5. Patient to ambulate without assistive device in her home  []  []  []        []  []  []      Date Addressed:            LTG: To be met in 20 treatments           1. Improve score on assessment tool KOOS from 68% impairment to less than 20% impairment  []  []  []      2. Reduce pain levels to 2/10 or less with ADLs []  []  []        []  []  []                       Patient goals: decrease pain with activity, return to work         Pt. Education:  [x] Yes  [] No  [] Reviewed Prior HEP/Ed  Method of Education: [] Verbal  [] Demo  [] Written  Comprehension of Education:  [x] Verbalizes understanding. [x] Demonstrates understanding. [x] Needs review. [] Demonstrates/verbalizes HEP/Ed previously given. Plan: [x] Continue current frequency toward long and short term goals.         Time In: 5:03pm            Time Out: 6:03pm    Electronically signed by:  Juan Carrasco PTA

## 2023-01-25 ENCOUNTER — APPOINTMENT (OUTPATIENT)
Dept: PHYSICAL THERAPY | Facility: CLINIC | Age: 53
End: 2023-01-25
Payer: COMMERCIAL

## 2023-01-26 ENCOUNTER — HOSPITAL ENCOUNTER (OUTPATIENT)
Dept: PHYSICAL THERAPY | Facility: CLINIC | Age: 53
Setting detail: THERAPIES SERIES
Discharge: HOME | End: 2023-01-26
Payer: COMMERCIAL

## 2023-01-26 PROCEDURE — 97016 VASOPNEUMATIC DEVICE THERAPY: CPT

## 2023-01-26 PROCEDURE — 97110 THERAPEUTIC EXERCISES: CPT

## 2023-01-26 NOTE — FLOWSHEET NOTE
[] [facility]  Outpatient Rehabilitation &  Therapy  [address] .  [phone]  F: [phone] [] [facility]  Outpatient Rehabilitation &  Therapy  [address]   [address]  P: [phone]  F: [phone] [] [facility]  Outpatient Rehabilitation &  Therapy  [address]  P: [phone]  F: [phone] [] [facility]  Outpatient Rehabilitation &  Therapy  [address]  P: [phone]  F: [phone] [x] [facility]  Outpatient Rehabilitation &  Therapy  [address]   [address]   P: [phone]  F: [phone]      Physical Therapy Daily Treatment Note    Date:  2023  Patient Name:  Kat Akbar    :  1970  MRN: [id number]  Physician: Dr Elena                                        Insurance: [facility] (14 remain , hard max, benefit period  to 3-31)  Medical Diagnosis: LLE TKA Revision        Rehab Codes: Z96.652, M62.81 (Muscle Weakness), M62.9 (Disorder of Muscle), M79.1 (Myalgia), Z73.6 (Limitation of ADLs)   Onset date: 1-3-23                 Next 's appt.: 2-15-23  Visit# / total visits:    Cancels/No Shows: 0    Subjective:    Pain:  [x] Yes  [] No Location: L knee  Pain Rating: (0-10 scale) 3/10  Pain altered Tx:  [x] No  [] Yes  Action:  Comments: Patient arrives using cane stating she has increased pain today because she has been busy. Patient states her doctor did not release her back to work because it is too early.       Objective:  Exercise     LLE TKA Knee   DOS 1-3-23 Reps/ Time Weight/ Level Comments             Bike 10'                 Calf stretch Stretch   3x30\"       Hamstring Stretch   3x30\"       Knee flexion stool stretch  3x30\"               SLR        Heel Slides        Clamshells        Bridges       Sidelying hip abduction               Gym       4-way hip Band   x20 Orange    Band TKE  10x10\" Lime    Total Gym Squats   L20    Total Gym  heel raises   L20    Balance Board  5' L2          Parallel bars      Lunges  2x15 bilat    Step ups   x20 6\"                                           Other: Vasocompression x10 minutes 34 degrees moderate compression supine          L knee ROM  ° A/P     1/17/23    Knee Flex 115 A    Ext 0 A                 Specific Instructions for next treatment: Advance as tolerated for TKA          Treatment Charges: Mins Units   []  Modalities     [x]  Ther Exercise 35 2   []  Manual Therapy     []  Ther Activities     []  Aquatics     [x]  Vasocompression 10 1   []  Other     Total Treatment time 45 3       Assessment: [x] Progressing toward goals. Patient notes increased soreness and pain with all exercises, therefore, did not complete all exercises listed above. Increased time required to complete exercises above due to increased symptoms. Ended with vaso per patient request to decrease pain in LLE.     [] No change. [] Other:  [x] Patient would continue to benefit from skilled physical therapy services in order to:  improve strenght, ROM, flexibility and improve her gait and functional mobility to return to normal ADLs and work     STG/LTG  Goals  MET NOT MET ON-  GOING  Details   Date Addressed:           STG: To be met in 10 treatments            1. ? Pain: Decrease pain levels to 4/10 with ADLs []  []  []      2. ? ROM: Increase LLE flexibility and AROM limitations throughout to equal bilat to reduce difficulty with ADLs []  []  []      3. ? Strength: Increase LLE MMT to 5/5 throughout to ease functional limitations and mobility  []  []  []      4. Independent with Home Exercise Programs []  []  []      5. Patient to ambulate without assistive device in her home  []  []  []        []  []  []      Date Addressed:            LTG: To be met in 20 treatments           1. Improve score on assessment tool KOOS from 68% impairment to less than 20% impairment  []  []  []      2.  Reduce pain levels to 2/10 or less with ADLs []  []  []        []  []  []                        Patient goals: decrease pain with activity, return to work         Pt. Education:  [x] Yes  [] No  [x] Reviewed Prior HEP/Ed, continue previous HEP given and focus on ROM. Method of Education: [x] Verbal  [x] Demo  [] Written  Comprehension of Education:  [] Verbalizes understanding. [] Demonstrates understanding. [] Needs review. [x] Demonstrates/verbalizes HEP/Ed previously given. Plan: [x] Continue current frequency toward long and short term goals.         Time In: 5:30pm            Time Out: 6:25pm    Electronically signed by:  Alvaro Lopez PTA

## 2023-01-30 ENCOUNTER — APPOINTMENT (OUTPATIENT)
Dept: PHYSICAL THERAPY | Facility: CLINIC | Age: 53
End: 2023-01-30
Payer: COMMERCIAL

## 2023-01-31 ENCOUNTER — HOSPITAL ENCOUNTER (OUTPATIENT)
Dept: PHYSICAL THERAPY | Facility: CLINIC | Age: 53
Setting detail: THERAPIES SERIES
Discharge: HOME | End: 2023-01-31
Payer: COMMERCIAL

## 2023-02-01 ENCOUNTER — APPOINTMENT (OUTPATIENT)
Dept: PHYSICAL THERAPY | Facility: CLINIC | Age: 53
End: 2023-02-01
Payer: COMMERCIAL

## 2023-02-02 ENCOUNTER — HOSPITAL ENCOUNTER (OUTPATIENT)
Dept: PHYSICAL THERAPY | Facility: CLINIC | Age: 53
Setting detail: THERAPIES SERIES
Discharge: HOME | End: 2023-02-02
Payer: COMMERCIAL

## 2023-02-02 PROCEDURE — 97110 THERAPEUTIC EXERCISES: CPT

## 2023-02-02 PROCEDURE — 97016 VASOPNEUMATIC DEVICE THERAPY: CPT

## 2023-02-02 NOTE — FLOWSHEET NOTE
[] [address]  - [facility] &  Therapy  [address]  [phone]  F: [phone] [] [address]  [address]   [address]  P: [phone]  F: [phone] [] [address]  [address] [facility] [address]  P: [phone]  F: [phone] [] [address] [facility]  P: [phone]  F: [phone] [x] [address]  [facility]   [address]   [place]: [phone]  F: [phone]      Physical Therapy Daily Treatment Note    Date:  2023  Patient Name:  Ricardo Lopez    :  1970  MRN: [id number]  Physician: Dr Rosie Shaw: Keisha (15 , hard max, benefit period  to 3-31)  Medical Diagnosis: LLE TKA Revision        Rehab Codes: U74.023, M62.81 (Muscle Weakness), M62.9 (Disorder of Muscle), M79.1 (Myalgia), Z73.6 (Limitation of ADLs)   Onset date: 1-3-23                 Next 's appt. : 2-15-23  Visit# / total visits:    Cancels/No Shows: 0    Subjective:    Pain:  [x] Yes  [] No Location: L knee  Pain Rating: (0-10 scale) 3/10  Pain altered Tx:  [x] No  [] Yes  Action:  Comments: Patient arrives using cane stating her L knee feels great but her R knee has been in a lot of pain and she does not know why. She scheduled an appt with Bhavesh Bell on 23 at 2:30pm because her R knee hurts to flex and extend with audible clicking with these motions. She reports her R knee feels like it is getting stuck or caught on something when she walks.        Objective:  Exercise     LLE TKA Knee   DOS 1-3-23 Reps/ Time Weight/ Level Comments             Bike 10'                 Calf stretch Stretch   3x30\"       Hamstring Stretch   3x30\"       Knee flexion stool stretch  3x30\"               SLR  x20 Heel Slides  x20      Clamshells  x20      Bridges  x20     Sidelying hip abduction  x20            Gym       4-way hip Band LLE OKC  x15 Orange    Band TKE  10x10\" Lime    Total Gym Squats  x20 L20    Total Gym heel raises  x20 L20    Balance Board  L2          Parallel bars      Lunges  bilat    Step ups   6\"                                           Other: Vasocompression x15 minutes 34 degrees moderate compression supine bilateral knees          L knee ROM  ° A/P     2/2/23    Knee Flex 115 A 120 P   Ext 0 A                 Specific Instructions for next treatment: Advance as tolerated for TKA          Treatment Charges: Mins Units   []  Modalities     [x]  Ther Exercise 28 2   []  Manual Therapy     []  Ther Activities     []  Aquatics     [x]  Vasocompression 15 1   []  Other     Total Treatment time 43 3       Assessment: [x] Progressing toward goals. Patient unable to perform all exercises due to increased R knee pain. Ended with vaso to bilateral knees due to increased pain in R and to decrease swelling in L. Able to passively flex L knee to 120 this date. Encouraged patient to use a strap for heel slides to gain more motion into flexion. Patient plans to see Svetlana Delatorre for her R knee and will attend PT after. Will resume all exercises next visit per patient tolerance. [] No change. [] Other:  [x] Patient would continue to benefit from skilled physical therapy services in order to:  improve strenght, ROM, flexibility and improve her gait and functional mobility to return to normal ADLs and work     STG/LTG  Goals  MET NOT MET ON-  GOING  Details   Date Addressed:           STG: To be met in 10 treatments            1. ? Pain: Decrease pain levels to 4/10 with ADLs []  []  []      2. ? ROM: Increase LLE flexibility and AROM limitations throughout to equal bilat to reduce difficulty with ADLs []  []  []      3. ? Strength:  Increase LLE MMT to 5/5 throughout to ease functional limitations and mobility []  []  []      4. Independent with Home Exercise Programs []  []  []      5. Patient to ambulate without assistive device in her home  []  []  []        []  []  []      Date Addressed:            LTG: To be met in 20 treatments           1. Improve score on assessment tool KOOS from 68% impairment to less than 20% impairment  []  []  []      2. Reduce pain levels to 2/10 or less with ADLs []  []  []        []  []  []                        Patient goals: decrease pain with activity, return to work         Pt. Education:  [x] Yes  [] No  [x] Reviewed Prior HEP/Ed, continue knee flexion ROM  Method of Education: [x] Verbal  [x] Demo  [] Written  Comprehension of Education:  [] Verbalizes understanding. [] Demonstrates understanding. [] Needs review. [x] Demonstrates/verbalizes HEP/Ed previously given. Plan: [x] Continue current frequency toward long and short term goals.         Time In: 5:00pm            Time Out: 5:53pm    Electronically signed by:  Kirstin Griffith PTA

## 2023-02-06 ENCOUNTER — HOSPITAL ENCOUNTER (OUTPATIENT)
Dept: PHYSICAL THERAPY | Facility: CLINIC | Age: 53
Setting detail: THERAPIES SERIES
Discharge: HOME | End: 2023-02-06
Payer: COMMERCIAL

## 2023-02-06 DIAGNOSIS — M25.561 RIGHT KNEE PAIN, UNSPECIFIED CHRONICITY: Primary | ICD-10-CM

## 2023-02-06 NOTE — CARE COORDINATION
[] [facility]) [facility] &  Therapy  [address]    [phone]  F: [phone]   [] [address]  [facility] [address]   [address]  P: [phone]  F: [phone]  [] [address] &  Therapy  [address]  P: [phone]  F: [phone] [] [address]  P: [phone]  F: [phone]  [x] [address]  [address]   [address]   [place]: [phone]  F: [phone]   [] [place] [id number] [facility] [address]  [place]: [phone]   F: [phone]     Physical Therapy Cancel/No Show note    Date: 2023  Patient: Jeet Sage  : 1970  MRN: [id number]    Cancels/No Shows to date:     For today's appointment patient:    [x]  Cancelled    [x] Rescheduled appointment    [] No-show     Reason given by patient:    []  Patient ill    []  Conflicting appointment    [] No transportation      [] Conflict with work    [] No reason given    [] Weather related    [] COVID-19    [x] Other:      Comments: Patient wanted an more convenient time, rescheduled.         [x] Next appointment was confirmed    Electronically signed by: Mariana Whitmore PTA

## 2023-02-07 ENCOUNTER — OFFICE VISIT (OUTPATIENT)
Dept: ORTHOPEDIC SURGERY | Age: 53
End: 2023-02-07

## 2023-02-07 ENCOUNTER — HOSPITAL ENCOUNTER (OUTPATIENT)
Dept: PHYSICAL THERAPY | Facility: CLINIC | Age: 53
Setting detail: THERAPIES SERIES
Discharge: HOME | End: 2023-02-07
Payer: COMMERCIAL

## 2023-02-07 VITALS — WEIGHT: 241 LBS | HEIGHT: 65 IN | BODY MASS INDEX: 40.15 KG/M2 | RESPIRATION RATE: 12 BRPM

## 2023-02-07 DIAGNOSIS — M17.11 PRIMARY OSTEOARTHRITIS OF RIGHT KNEE: Primary | ICD-10-CM

## 2023-02-07 DIAGNOSIS — M25.461 EFFUSION OF RIGHT KNEE: ICD-10-CM

## 2023-02-07 PROCEDURE — 97016 VASOPNEUMATIC DEVICE THERAPY: CPT

## 2023-02-07 PROCEDURE — 97110 THERAPEUTIC EXERCISES: CPT

## 2023-02-07 RX ORDER — TRIAMCINOLONE ACETONIDE 40 MG/ML
40 INJECTION, SUSPENSION INTRA-ARTICULAR; INTRAMUSCULAR ONCE
Status: COMPLETED | OUTPATIENT
Start: 2023-02-07 | End: 2023-02-07

## 2023-02-07 RX ORDER — LIDOCAINE HYDROCHLORIDE 10 MG/ML
6 INJECTION, SOLUTION INFILTRATION; PERINEURAL ONCE
Status: COMPLETED | OUTPATIENT
Start: 2023-02-07 | End: 2023-02-07

## 2023-02-07 RX ADMIN — TRIAMCINOLONE ACETONIDE 40 MG: 40 INJECTION, SUSPENSION INTRA-ARTICULAR; INTRAMUSCULAR at 11:20

## 2023-02-07 RX ADMIN — LIDOCAINE HYDROCHLORIDE 6 ML: 10 INJECTION, SOLUTION INFILTRATION; PERINEURAL at 11:20

## 2023-02-07 ASSESSMENT — ENCOUNTER SYMPTOMS
ABDOMINAL DISTENTION: 0
CONSTIPATION: 0
SHORTNESS OF BREATH: 0
COUGH: 0
DIARRHEA: 0
RESPIRATORY NEGATIVE: 1
ABDOMINAL PAIN: 0
VOMITING: 0
NAUSEA: 0
COLOR CHANGE: 0
APNEA: 0
CHEST TIGHTNESS: 0

## 2023-02-07 NOTE — FLOWSHEET NOTE
[x] [facility]  Outpatient Rehabilitation &  Therapy  [facility]   [place]: [phone]  F: [phone]      Physical Therapy Daily Treatment Note    Date:  2023  Patient Name:  Sabrina Rodrigues    :  1970  MRN: [id number]  Physician: Dr Bharat Wilkins: Keisha (15 remain of 25, hard max, benefit period 4-1 to 3-31)  Medical Diagnosis: LLE TKA Revision        Rehab Codes: I18.542, M62.81 (Muscle Weakness), M62.9 (Disorder of Muscle), M79.1 (Myalgia), Z73.6 (Limitation of ADLs)   Onset date: 1-3-23                 Next 's appt. : 2-15-23  Visit# / total visits:   Cancels/No Shows: 0    Subjective:    Pain:  [x] Yes  [] No Location: L knee  Pain Rating: (0-10 scale) 3/10  Pain altered Tx:  [x] No  [] Yes  Action:  Comments: Patient arrives using cane she states she feels stiff this morning. Patient states she has an appointment with ortho after her visit today. Objective:  Exercise     LLE TKA Knee   DOS 1-3-23 Reps/ Time Weight/ Level Comments             Bike 10'                 Calf stretch Stretch   3x30\"       Hamstring Stretch   3x30\"       Knee flexion stool stretch  3x30\"               SLR  x20      Heel Slides  x20      Clamshells  x20      Bridges  x20     Sidelying hip abduction  x20            Gym       4-way hip Band LLE OKC  x15 Orange Not today   Band TKE  10x10\" Lime Not today   Total Gym Squats  x20 L20    Total Gym heel raises  x20 L20    Balance Board  L2 Not today         Parallel bars      Lunges 10x bilat    Step ups  2x10 6\"                                           Other: Vasocompression x15 minutes 34 degrees moderate compression supine LLE knee         L knee ROM  ° A/P     23    Knee Flex 115 A 120 P   Ext 0 A                 Specific Instructions for next treatment: Ask about ortho visit from (23) continue to advance as tolerated for TKA.          Treatment Charges: Mins Units   [] Modalities     [x]  Ther Exercise 30 2   []  Manual Therapy     []  Ther Activities     []  Aquatics     [x]  Vasocompression 15 1   []  Other     Total Treatment time 45 3       Assessment: [x] Progressing toward goals. Patient experienced increased right knee pain this date. Patient unable to perform all exercises due to increased R knee pain. Patient has an appointment with ortho this date for her right knee pain. Will resume all exercises next visit per patient tolerance. [] No change. [] Other:  [x] Patient would continue to benefit from skilled physical therapy services in order to:  improve strenght, ROM, flexibility and improve her gait and functional mobility to return to normal ADLs and work       Goals  MET NOT MET ON-  GOING  Details   Date Addressed:           STG: To be met in 10 treatments            1. ? Pain: Decrease pain levels to 4/10 with ADLs []  []  []      2. ? ROM: Increase LLE flexibility and AROM limitations throughout to equal bilat to reduce difficulty with ADLs []  []  []      3. ? Strength: Increase LLE MMT to 5/5 throughout to ease functional limitations and mobility  []  []  []      4. Independent with Home Exercise Programs []  []  []      5. Patient to ambulate without assistive device in her home  []  []  []        []  []  []      Date Addressed:            LTG: To be met in 20 treatments           1. Improve score on assessment tool KOOS from 68% impairment to less than 20% impairment  []  []  []      2. Reduce pain levels to 2/10 or less with ADLs []  []  []        []  []  []                        Patient goals: decrease pain with activity, return to work         Pt. Education:  [x] Yes  [] No  [x] Reviewed Prior HEP/Ed, continue knee flexion ROM  Method of Education: [x] Verbal  [x] Demo  [] Written  Comprehension of Education:  [] Verbalizes understanding. [] Demonstrates understanding. [] Needs review.   [x] Demonstrates/verbalizes HEP/Ed previously given.     Plan: [x] Continue current frequency toward long and short term goals.         Time In: 0900           Time Out: 2467    Electronically signed by:  Louise Pantoja, Student Physical Therapist  This document has been reviewed and approved by ALEXANDER Garnett PT

## 2023-02-07 NOTE — PROGRESS NOTES
[address]  [facility]  [address]  [facility] [address]  Dept: [phone]  Dept Fax: [phone]          Right Knee -established patient new problem  Subjective:     Chief Complaint   Patient presents with    Knee Pain     R Knee Pain     HPI:     Fer Valentino presents today for right knee pain. The pain has been present for 2 weeks. The patient recalls no specific injury. The patient has tried ice, cane, elevation, physical therapy, Percocet, tylenol with mild improvement. The pain is now described as [place], and Dull . There is  pain on weight bearing. The knee has swelled. There is  painful popping and clicking. The knee has caught or locked up. The knee has not given out. It is  stiff upon arising from sitting. It is  painful to go up and down stairs and sit for a prolonged time. The patient has not had a cortisone injection. The patient has not tried a lubrication injection. The patient has tried physical therapy. The patient has not had surgery. She is 5 weeks past a revision of a left total knee arthroplasty. ROS:   Review of Systems   Constitutional:  Positive for activity change. Negative for appetite change, fatigue and fever. Respiratory: Negative. Negative for apnea, cough, chest tightness and shortness of breath. Cardiovascular: Negative. Negative for chest pain, palpitations and leg swelling. Gastrointestinal:  Negative for abdominal distention, abdominal pain, constipation, diarrhea, nausea and vomiting. Genitourinary:  Negative for difficulty urinating, dysuria and hematuria. Musculoskeletal:  Positive for arthralgias, gait problem and joint swelling. Negative for myalgias. Skin:  Negative for color change and rash. Neurological:  Negative for dizziness, weakness, numbness and headaches. Psychiatric/Behavioral:  Positive for sleep disturbance.       Past Medical History:    Past Medical History:   Diagnosis Date    Arthritis     Bladder spasms     Depression     Diabetes mellitus ([address]     pre-diabetic    Fracture     left humerus/ Fall    GERD (gastroesophageal reflux disease)     Hypertension     Kidney stones     Pain     left humerus    Personal history of other medical treatment     Pt. denies ever being Diabetic, states she was put on Amaryl due to high A1C before a previous surgery.  Pt. states PCP will be taking her off this med at next visit    Seasonal allergies     Snores     denies apnea    Under care of team     urology/ Dr. Bella Plasencia last seen 6-30-21    Urinary urgency     UTI (urinary tract infection)     frequent    Wears glasses     Wellness examination     PCP Elly Miller MD/arley/ last seen 7-9-21       Past Surgical History:    Past Surgical History:   Procedure Laterality Date    COLONOSCOPY      FOREARM SURGERY Left 10/1/2021    OPEN REDUCTION INTERNAL FIXATION PROXIMAL HUMERUS FRACTURE, SYNTHES, C-ARM performed by Shahzad So DO at [facility] Left 10/01/2021    ORIF     HYSTERECTOMY (CERVIX STATUS UNKNOWN)  2016    KNEE ARTHROSCOPY Left 2016    KNEE ARTHROSCOPY Left 2/14/2020    LEFT KNEE ARTHROSCOPY WITH DEBRIDEMENT REMOVAL OF LOOSE BODY performed by Zuleyka Montez DO at [facility]  2005    REVISION TOTAL KNEE ARTHROPLASTY Left 1/3/2023    LEFT KNEE TOTAL ARTHROPLASTY REVISION       WITH FROZEN SECTION      PRP PLATELET GEL performed by Gibson Rowell DO at [address] [facility] ARTHROSCOPY Right 2010    SHOULDER SURGERY Left 10/01/2021    SHOULDER SURGERY Left 04/19/2022     LEFT SHOULDER IMPLANT REMOVAL AND MANIPULATION UNDER ANESTHESIA WITH SYNTHES SCREWDRIVERS AND PRE-OP INTERSCALENE BLOCK NO EXPAREL (Left Shoulder)    SHOULDER SURGERY Left 4/19/2022    LEFT SHOULDER IMPLANT REMOVAL AND MANIPULATION UNDER ANESTHESIA WITH SYNTHES SCREWDRIVERS AND PRE-OP INTERSCALENE BLOCK NO EXPAREL performed by Randy Gillespie MD at [address]  12/2020    TOTAL KNEE ARTHROPLASTY Left 12/8/2020    LEFT KNEE TOTAL ARTHROPLASTY performed by Ina Matamoros DO at [address]:   Current Outpatient Medications   Medication Sig Dispense Refill    docusate sodium (COLACE) 100 MG capsule Take 1 capsule by mouth 2 times daily as needed for Constipation 60 capsule 0    aspirin EC 81 MG EC tablet Take 1 tablet by mouth 2 times daily 84 tablet 0    Multiple Vitamins-Minerals (HAIR SKIN AND NAILS FORMULA PO) Take 1 tablet by mouth daily      citalopram (CELEXA) 40 MG tablet Take 40 mg by mouth daily      glimepiride (AMARYL) 1 MG tablet Take 0.5 mg by mouth daily Takes 1/2 of a 1 mg tablet      Ascorbic Acid (VITAMIN C PO) Take 250 mg by mouth nightly      omeprazole (PRILOSEC) 20 MG delayed release capsule Take 20 mg by mouth daily as needed       metoprolol tartrate (LOPRESSOR) 25 MG tablet Take 25 mg by mouth daily        No current facility-administered medications for this visit. Allergies:    Morphine sulfate [morphine], Meperidine hcl, and Sulfa antibiotics    Social History:   Social History     Socioeconomic History    Marital status:      Spouse name: None    Number of children: None    Years of education: None    Highest education level: None   Tobacco Use    Smoking status: Never    Smokeless tobacco: Never   Vaping Use    Vaping Use: Never used   Substance and Sexual Activity    Alcohol use: Yes     Comment: rare    Drug use: Never    Sexual activity: Never       Family History:  Family History   Problem Relation Age of Onset    No Known Problems Mother     No Known Problems Father        Vitals:   Resp 12   Ht 5' 5\" (1.651 m)   Wt 241 lb (109.3 kg)   BMI 40.10 kg/m²  Body mass index is 40.1 kg/m².   Physical Examination:     Orthopedics:    GENERAL: Alert and oriented X3 in no acute distress. SKIN: Intact without lesions or ulcerations. NEURO: Intact to sensory and motor testing. VASC: Capillary refill is less than 3 seconds. KNEE EXAM    LOCATION: Right Knee  GEN: Alert and oriented X 3, in no acute distress. GAIT: The patient's gait was observed while entering the exam room and was noted to be antalgic. The extremity is in anatomic alignment. SKIN: Intact without rashes, lesions, or ulcerations. No obvious deformity or swelling. NEURO: The patient responds to light touch throughout bilateral LE. Patellar and Achilles reflexes are 2/4. VASC: The bilateral LE is neurovascularly intact with 2/4 DP and 2/4 PT pulses. Brisk capillary refill. ROM: 0/120 degrees. There is mild effusion. MUSC: Slightly decreased quad tone  LIGAMENT: Lachman's test is negative with good endpoint. Anterior drawer negative. Posterior drawer negative. There is no varus instability at 0 degrees and no varus instability at 30 degrees. There is no valgus instability at 0 degrees and no valgus instability at 30 degrees. SPECIAL: Ghulam test is positive with no clunks, positive crepitation, and positive pain. PALP: There is medial joint line pain. Assessment:     1. Primary osteoarthritis of right knee    2. Effusion of right knee      Procedures:    Procedure: yes    Joint aspiration of the right knee. The patient was placed in the supine position on the exam table. The superior lateral portal was identified and marked. The skin was prepped with betadine in a sterile fashion. Utilizing ultrasound for precise placement and clean technique 4cc's of  1% lidocaine  was injected. There was no resistance to the injection. Thereafter the skin was prepped with betadine in a sterile fashion once again and the joint was aspirated with a 60cc syringe. After aspirating the joint, 5 cc's of blood tinged synovial fluid was removed from the knee.  The wound was then cleansed and a band-aid was placed over the superior lateral portal site. The patient tolerated the procedure without difficulty. Adverse reactions to the aspiration were discussed with the patient including signs of infection (increasing pain, redness, swelling) and the patient was instructed to call immediately if experiencing any of these symptoms. Regular Knee Injection    Location: Right Knee  Procedure: I discussed in detail the risks, benefits and complications of the corticosteroid injection which included but are not limited to: infection, skin reactions, hot swollen, and anaphylaxis with the patient. Jenna Clemons verbalized understanding and they have agreed to have the corticosteroid injection into the right knee. The patient was placed in the Supine position on the exam table. The superior lateral portal was identified and marked with a ball point pen. The skin was prepped with betadine in a sterile fashion. Utilizing a [employer] ultrasound unit with a variable frequency linear transducer and clean technique with sterile gloves, a 3 cc solution containing 2 cc of 1% lidocaine   with 1 cc containing 40 mg of Kenalog was injected. There was no resistance to the injection. The wound was cleansed and a band-aid was placed. the patient tolerated the procedure without difficulty. Adverse reactions to the injection were discussed with the patient including signs of infection (increasing pain, redness, swelling) and the patient was instructed to call immediately if experiencing any of these symptoms. Images of the injection site were recorded throughout the procedure and are saved on the SD card which is stored in the GE ultrasound unit. All images were downloaded and stored in patient's chart. Radiology:   KNEE X-RAY    4 views of the right knee including AP, tunnel, and lateral in the upright position, and skyline views reveal anatomic alignment with no fracture or dislocation.   Kellgren grade II/III changes of osteoarthritis (joint space narrowing, osteophyte, subchondral sclerosis, bony deformity/cyst) of the tricompartment(s). No osseous loose bodies. No bony erosion or periosteal reaction. No soft tissue masses. Impression: Degenerative changes as described above of the right knee. Plan:   Treatment : I reviewed the x-ray with the patient and I informed them that the knee does show some mild to moderate changes. We discussed the etiologies and natural histories of right knee arthritis with effusion. We discussed the various treatment alternatives including anti-inflammatory medications, physical therapy, injections, further imaging studies and as a last result surgery. During today's visit, we discussed that her right knee has been having to do so much more due to the fact that she just had her left knee revised. I do think that that has aggravated the exit arthritis she has in her knee. She does have effusion in there. We can aspirate the fluid and do a cortisone injection. She can continue going to therapy. The patient has opted for a cortisone injection into the right knee to help reduce inflammation and pain. The injection site should never get red, hot, or swollen and if it does the patient will contact our office right away. The patient may experience a increase in soreness the first 24-48 hours due to a cortisone flair and can take anti-inflammatories for a short period of time to reduce that soreness. The patient should not submerge the injection site in water for a minimum of 24 hours to avoid infection. This means no lakes, pools, ponds, or hot tubs for 24 hours. If the patient is diabetic the injection may increase their blood sugar for up to one week. The patient can do this cortisone injection once every 3 months as needed. If the injections stop working and do not give the patient relief the patient should consider surgical interventions to produce long term relief.  Patient should return to the clinic in 6 weeks to follow up with  Lianne James PA-C. The patient will call the office immediately with any problems. Orders Placed This Encounter   Medications    triamcinolone acetonide (KENALOG-40) injection 40 mg    lidocaine 1 % injection 6 mL         No orders of the defined types were placed in this encounter. This note is created with the assistance of a speech recognition program.  While intending to generate a document that actually reflects the content of the visit, the document can still have some errors including those of syntax and sound a like substitutions which may escape proof reading.   In such instances, actual meaning can be extrapolated by contextual diversion    Electronically signed by Murali Villar PA-C, on 2/7/2023 at 2:59 PM

## 2023-02-09 ENCOUNTER — HOSPITAL ENCOUNTER (OUTPATIENT)
Dept: PHYSICAL THERAPY | Facility: CLINIC | Age: 53
Setting detail: THERAPIES SERIES
Discharge: HOME | End: 2023-02-09
Payer: COMMERCIAL

## 2023-02-09 PROCEDURE — 97110 THERAPEUTIC EXERCISES: CPT

## 2023-02-09 PROCEDURE — 97016 VASOPNEUMATIC DEVICE THERAPY: CPT

## 2023-02-09 NOTE — FLOWSHEET NOTE
[x] [facility]  Outpatient Rehabilitation &  Therapy  [facility]   [place]: [phone]  F: [phone]      Physical Therapy Daily Treatment Note    Date:  2023  Patient Name:  Jeet Sage    :  1970  MRN: [id number]  Physician: Dr Erik Hendricks: Keisha (15 remain of 25, hard max, benefit period 4 to 3-31)  Medical Diagnosis: LLE TKA Revision        Rehab Codes: C02.632, M62.81 (Muscle Weakness), M62.9 (Disorder of Muscle), M79.1 (Myalgia), Z73.6 (Limitation of ADLs)   Onset date: 1-3-23                 Next 's appt. : 2-15-23  Visit# / total visits:    Cancels/No Shows: 0    Subjective:    Pain:  [x] Yes  [] No Location: L knee  Pain Rating: (0-10 scale) 3/10  Pain altered Tx:  [x] No  [] Yes  Action:  Comments: Patient arrives without cane stating burning in lateral knee and has had intermittently but when it comes on it lasts for a long time so she plans on telling her doctor next week on 2/15/23. Patient saw Mervat Gil to get fluid removed from her R knee and a cortisone injection with improved symptoms in her knee afterwards. Plans to get a TKA in R knee this December.           Objective:  Exercise     LLE TKA Knee   DOS 1-3-23 Reps/ Time Weight/ Level Comments             Bike 10'                 Calf stretch Stretch   3x30\"       Hamstring Stretch   3x30\"       Knee flexion stool stretch  3x30\"               SLR  x20      Heel Slides  x20      Clamshells  x20      Bridges  x20     Sidelying hip abduction  x20            Gym       4-way hip Band bilateral x15 Orange    Band TKE LLE 10x10\" Lime    Total Gym Squats  x20 L20    Total Gym heel raises  x20 L20    Balance Board 5' L2          Parallel bars      Lunges 10x bilat    Step ups+march x15 6\" Unilateral UE support    LLE SLS 3x30\"                                     Other: Vasocompression x15 minutes 34 degrees moderate compression supine LLE knee         L knee ROM  ° A/P     2/9/23    Knee Flex 118A    Ext 0 A                 Specific Instructions for next treatment:          Treatment Charges: Mins Units   []  Modalities     [x]  Ther Exercise 45 3   []  Manual Therapy     []  Ther Activities     []  Aquatics     [x]  Vasocompression 15 1   []  Other     Total Treatment time 60 4       Assessment: [x] Progressing toward goals. Focused on quad strength and single leg stability with difficulty noted requiring frequent rest breaks. Advised patient to begin single leg exercises and lunges at home and focus on quad isolated exercises. Improved ROM measure this date. Ended with vaso to L knee per patient request. Will continue to progress LLE strength and stability next visit. [] No change. [] Other:  [x] Patient would continue to benefit from skilled physical therapy services in order to:  improve strenght, ROM, flexibility and improve her gait and functional mobility to return to normal ADLs and work       Goals  MET NOT MET ON-  GOING  Details   Date Addressed:           STG: To be met in 10 treatments            1. ? Pain: Decrease pain levels to 4/10 with ADLs []  []  []      2. ? ROM: Increase LLE flexibility and AROM limitations throughout to equal bilat to reduce difficulty with ADLs []  []  []      3. ? Strength: Increase LLE MMT to 5/5 throughout to ease functional limitations and mobility  []  []  []      4. Independent with Home Exercise Programs []  []  []      5. Patient to ambulate without assistive device in her home  []  []  []        []  []  []      Date Addressed:            LTG: To be met in 20 treatments           1. Improve score on assessment tool KOOS from 68% impairment to less than 20% impairment  []  []  []      2. Reduce pain levels to 2/10 or less with ADLs []  []  []        []  []  []                        Patient goals: decrease pain with activity, return to work         Pt.  Education:  [x] Yes  [] No  [x] Reviewed Prior HEP/Ed, continue knee flexion ROM  Method of Education: [x] Verbal  [x] Demo  [] Written  Comprehension of Education:  [] Verbalizes understanding. [] Demonstrates understanding. [] Needs review. [x] Demonstrates/verbalizes HEP/Ed previously given. Plan: [x] Continue current frequency toward long and short term goals.         Time In: 8:00am          Time Out: 9:10am    Electronically signed by:  Helga Casas PTA

## 2023-02-13 ENCOUNTER — OFFICE VISIT (OUTPATIENT)
Dept: ORTHOPEDIC SURGERY | Age: 53
End: 2023-02-13

## 2023-02-13 VITALS — BODY MASS INDEX: 40.15 KG/M2 | HEIGHT: 65 IN | WEIGHT: 241 LBS | RESPIRATION RATE: 12 BRPM

## 2023-02-13 DIAGNOSIS — L03.116 CELLULITIS OF LEFT KNEE: ICD-10-CM

## 2023-02-13 DIAGNOSIS — M79.2 NERVE PAIN: ICD-10-CM

## 2023-02-13 DIAGNOSIS — Z96.652 STATUS POST REVISION OF TOTAL REPLACEMENT OF LEFT KNEE: Primary | ICD-10-CM

## 2023-02-13 PROCEDURE — 99024 POSTOP FOLLOW-UP VISIT: CPT | Performed by: PHYSICIAN ASSISTANT

## 2023-02-13 RX ORDER — GABAPENTIN 300 MG/1
CAPSULE ORAL
Qty: 63 CAPSULE | Refills: 0 | Status: SHIPPED | OUTPATIENT
Start: 2023-02-13 | End: 2023-03-15

## 2023-02-13 RX ORDER — VIBEGRON 75 MG/1
75 TABLET, FILM COATED ORAL DAILY
COMMUNITY

## 2023-02-13 RX ORDER — CEPHALEXIN 500 MG/1
500 CAPSULE ORAL 4 TIMES DAILY
Qty: 40 CAPSULE | Refills: 0 | Status: SHIPPED | OUTPATIENT
Start: 2023-02-13 | End: 2023-02-23

## 2023-02-13 ASSESSMENT — ENCOUNTER SYMPTOMS
COUGH: 0
RESPIRATORY NEGATIVE: 1
VOMITING: 0
SHORTNESS OF BREATH: 0
COLOR CHANGE: 0
ABDOMINAL PAIN: 0
ABDOMINAL DISTENTION: 0
CHEST TIGHTNESS: 0
APNEA: 0
DIARRHEA: 0
CONSTIPATION: 0
NAUSEA: 0

## 2023-02-13 NOTE — PROGRESS NOTES
[address]  [facility]TS MEDICINE  [address]  [facility] Radha Snyder [facility] [address]  Dept: [phone]  Dept Fax: [phone]        Post Operative Follow Up    Subjective:     Chief Complaint   Patient presents with    Post-Op Check     L Knee Pain     Post Op Surgery:     The patient is here for a follow up after having a left total knee arthroplasty revision on 1/3/2023 completed by Dr. Miladis Caro DO. Therefore the patient is 6 weeks postop. She has been having significant burning \"like on fire\" pain on the outside of her left knee she called the office and stated it was getting so bad that she was given a go to the ER. She has tried lidocaine patches, Tylenol, ibuprofen. She had a similar issue after her first TKA. She states she can not live with this issue. She has also tried to over stimulate it. Patient states they are having such bad burning pain that she has been unable to sleep and has been miserable all weekend. She denies any fever or chills. Review of Systems   Constitutional:  Positive for activity change. Negative for appetite change, fatigue and fever. Respiratory: Negative. Negative for apnea, cough, chest tightness and shortness of breath. Cardiovascular: Negative. Negative for chest pain, palpitations and leg swelling. Gastrointestinal:  Negative for abdominal distention, abdominal pain, constipation, diarrhea, nausea and vomiting. Genitourinary:  Negative for difficulty urinating, dysuria and hematuria. Musculoskeletal:  Positive for arthralgias and gait problem. Negative for joint swelling and myalgias. Skin:  Negative for color change and rash. Neurological:  Negative for dizziness, weakness, numbness and headaches. Psychiatric/Behavioral:  Positive for sleep disturbance.       I have reviewed the CC, HPI, ROS, PMH, FHX, Social History, and if not present in this note, I have reviewed in the patient's chart. I agree with the documentation provided by other staff and have reviewed their documentation prior to providing my signature indicating agreement. Vitals:   Resp 12   Ht 5' 5\" (1.651 m)   Wt 241 lb (109.3 kg)   BMI 40.10 kg/m²  Body mass index is 40.1 kg/m². Physical Examination:     Orthopedics:    GENERAL: Alert and oriented X3 in no acute distress. SKIN: Intact without lesions or ulcerations. Incision is well healed with no signs of infection. NEURO: Intact to sensory and motor testing. VASC: Capillary refill is less than 3 seconds. Post Op Exam:    LOCATION: left knee  SITE: Distal neurocirculatory status is intact. EXAM: Sensation is intact to light touch, there is full motor function of the extremity. ROM: 0/120 degrees     Radiology:   No results found. Assessment:     1. Status post revision of total replacement of left knee    2. Nerve pain    3. Cellulitis of left knee      Plan:   Post Op Treatment: Patient had the treatment regimen reviewed today that the patient is having some possible cellulitis versus just a skin nerve issue. I reviewed the films with the patient. During today's visit, because of the redness I would like her to start on some Keflex 500 mg 4 times a day. I want her to take that for a couple of days to see if that makes a difference. If it does not make a difference then I want her to begin the gabapentin. The patient states understanding. She will call if she has any questions or concerns. She will change her follow-up with Dr. Sonya Rowell to 2 weeks. She will call if she has any questions or concerns prior to that. The patient will call the office immediately with any problems that may arise.      Orders Placed This Encounter   Medications    cephALEXin (KEFLEX) 500 MG capsule     Sig: Take 1 capsule by mouth 4 times daily for 10 days     Dispense:  40 capsule     Refill:  0    gabapentin (NEURONTIN) 300 MG capsule     Sig: First 3 days take 1 tab at night. Next 3 days take 1 tab at 8am, 1 tab at 2pm & 1 tab at night. Next 3 days take 1 tab at 8am, 1 tab at 2pm & 2 tabs at night. Next 3 days take 2 tabs at 8am, 1 tab at 2pm & 2 tabs at night. Dispense:  63 capsule     Refill:  0         No orders of the defined types were placed in this encounter.         Electronically signed by Tiffanie Gregorio PA-C, on 2/13/2023 at 1:49 PM

## 2023-02-14 ENCOUNTER — PATIENT MESSAGE (OUTPATIENT)
Dept: ORTHOPEDIC SURGERY | Age: 53
End: 2023-02-14

## 2023-02-14 ENCOUNTER — HOSPITAL ENCOUNTER (OUTPATIENT)
Dept: PHYSICAL THERAPY | Facility: CLINIC | Age: 53
Setting detail: THERAPIES SERIES
Discharge: HOME | End: 2023-02-14
Payer: COMMERCIAL

## 2023-02-14 PROCEDURE — 97110 THERAPEUTIC EXERCISES: CPT

## 2023-02-14 NOTE — FLOWSHEET NOTE
[x] [facility]  Outpatient Rehabilitation &  Therapy  [facility]   [place]: [phone]  F: [phone]      Physical Therapy Daily Treatment Note    Date:  2023  Patient Name:  Tay Mancini    :  1970  MRN: [id number]  Physician: Dr Stephania Doshi: [facility] (15 remain of 25, hard max, benefit period 4- to 3-31)  Medical Diagnosis: LLE TKA Revision        Rehab Codes: L37.190, M62.81 (Muscle Weakness), M62.9 (Disorder of Muscle), M79.1 (Myalgia), Z73.6 (Limitation of ADLs)   Onset date: 1-3-23                 Next 's appt. : 2-15-23  Visit# / total visits: 10/14   Cancels/No Shows: 0        Subjective:    Pain:  [x] Yes  [] No Location: LLE knee   Pain Rating: (0-10 scale) 3/10  Pain altered Tx:  [x] No  [] Yes  Action:  Comments: Patient reports today with continued left knee nerve pain, less overall joint pain. Objective:  Exercise     LLE TKA Knee   DOS 1-3-23 Reps/ Time Weight/ Level Comments             Bike 10'                 Calf stretch Stretch   3x30\"       Hamstring Stretch   3x30\"       Knee flexion stool stretch  3x30\"               SLR  x20      Heel Slides  x20      Clamshells  x20      Bridges  x20     Sidelying hip abduction  x20            Gym       4-way hip Band bilateral x15 Green     Band TKE LLE 10x10\" Green     Total Gym Squats  x20 L20    Total Gym heel raises  x20 L20    Balance Board 5' L2          Parallel bars      Lunges x10 bilat    Step ups+march x15 6\" Unilateral UE support    LLE SLS rebounder x20   Volleyball                                        Treatment Charges: Mins Units   []  Modalities     [x]  Ther Exercise 50 3   []  Manual Therapy     []  Ther Activities     []  Aquatics     []  Vasocompression     []  Other     Total Treatment time 50 3       Assessment: [x] Progressing toward goals. Therex as pr flow sheet, reviewed HEP, patient with good understanding and performance.  Patient reports she is not feeling well due to an adverse effect of her medication she is on today, nausea side effects. [] No change. [] Other:  [x] Patient would continue to benefit from skilled physical therapy services in order to:  improve strenght, ROM, flexibility and improve her gait and functional mobility to return to normal ADLs and work       Goals  MET NOT MET ON-  GOING  Details   Date Addressed:           STG: To be met in 10 treatments            1. ? Pain: Decrease pain levels to 4/10 with ADLs []  []  []      2. ? ROM: Increase LLE flexibility and AROM limitations throughout to equal bilat to reduce difficulty with ADLs []  []  []      3. ? Strength: Increase LLE MMT to 5/5 throughout to ease functional limitations and mobility  []  []  []      4. Independent with Home Exercise Programs []  []  []      5. Patient to ambulate without assistive device in her home  []  []  []        []  []  []      Date Addressed:            LTG: To be met in 20 treatments           1. Improve score on assessment tool KOOS from 68% impairment to less than 20% impairment  []  []  []      2. Reduce pain levels to 2/10 or less with ADLs []  []  []        []  []  []                        Patient goals: decrease pain with activity, return to work         Pt. Education:  [x] Yes  [] No  [x] Reviewed Prior HEP/Ed, continue knee flexion ROM  Method of Education: [x] Verbal  [x] Demo  [] Written  Comprehension of Education:  [] Verbalizes understanding. [] Demonstrates understanding. [] Needs review. [x] Demonstrates/verbalizes HEP/Ed previously given. Plan: [x] Continue current frequency toward long and short term goals.         Time In: 0900     Time Out: 1000    Electronically signed by:  Mauricio Urbina, PT

## 2023-02-14 NOTE — TELEPHONE ENCOUNTER
You say this patient yesterday. She is asking about return to work. She states her  part-time work note ends tomorrow.  Do you want her to return to work full time moving forward

## 2023-02-14 NOTE — LETTER
[facility]  [address] [address] [place] [address]  Phone: [phone]  Fax: [address],         February 14, 2023     Patient: Elva Nava   YOB: 1970   Date of Visit: 2/14/2023       To Whom it May Concern: It is my medical opinion that Barrington Dominguez continue working 5 hours per day, and going to campus only as needed. This can continue for 6 more weeks. This is required for additional healing as well as follow up appointments. If you have any questions or concerns, please don't hesitate to call.     Sincerely,         Jessa Mendenhall, DO

## 2023-02-21 ENCOUNTER — HOSPITAL ENCOUNTER (OUTPATIENT)
Dept: PHYSICAL THERAPY | Facility: CLINIC | Age: 53
Setting detail: THERAPIES SERIES
Discharge: HOME | End: 2023-02-21
Payer: COMMERCIAL

## 2023-02-21 PROCEDURE — 97140 MANUAL THERAPY 1/> REGIONS: CPT

## 2023-02-21 PROCEDURE — 97110 THERAPEUTIC EXERCISES: CPT

## 2023-02-21 NOTE — FLOWSHEET NOTE
[x] [facility]  Outpatient Rehabilitation &  Therapy  [facility]   [place]: [phone]  F: [phone]      Physical Therapy Daily Treatment Note    Date:  2023  Patient Name:  Padmini Reyes    :  1970  MRN: [id number]  Physician: Dr Vera Getting: [facility] (15 remain of 25, hard max, benefit period 4- to 3-31)  Medical Diagnosis: LLE TKA Revision        Rehab Codes: M64.957, M62.81 (Muscle Weakness), M62.9 (Disorder of Muscle), M79.1 (Myalgia), Z73.6 (Limitation of ADLs)   Onset date: 1-3-23                 Next 's appt. : 2-15-23  Visit# / total visits:    Cancels/No Shows: 0      Subjective:    Pain:  [x] Yes  [] No Location: LLE knee   Pain Rating: (0-10 scale) not rated/10  Pain altered Tx:  [x] No  [] Yes  Action:  Comments: Patient reports today with L calf pain, knee has been feeling great. Upon questioning, she reports that she has been doing frequent heel raises. Unable to describe her method of calf stretching. She reports that she was started on gabapentin for her nerve pain, and this has significantly improved.        Objective:  Exercise     LLE TKA Knee   DOS 1-3-23 Reps/ Time Weight/ Level Comments             Bike 10'                 Calf stretch Stretch  3x30\"  3x30\"   1st set long sitting with towel   2nd set at slantboard   Soleus stretching  3x30\"  L only   At slantboard    Hamstring Stretch   3x30\"       Knee flexion stool stretch  3x30\"               SLR        Heel Slides  HEP      Clamshells  x20 Orange      Bridges  x20     Sidelying hip abduction  x20 Lucas           Gym       4-way hip Band bilateral x15 Green     Band TKE LLE 10x10\" Green     Total Gym Squats  x20 L20    Total Gym heel raises  x20 L20    Balance Board  L2          Parallel bars      Lunges  bilat    Step ups+march x20 4\" Unilaterl UE support    LLE SLS rebounder    Volleyball                                    Other: MFR to the L gastroc/soleus muscle using [employer]      Treatment Charges: Mins Units   []  Modalities     [x]  Ther Exercise 47 3   []  Manual Therapy 8 1   []  Ther Activities     []  Aquatics     []  Vasocompression     []  Other     Total Treatment time 55 4       Assessment: [x] Progressing toward goals. Initiated session with MFR to the L calf muscle followed by calf stretching. Reviewed calf stretching as she has not been doing at home and added soleus stretching. Improved pain reported following this. Continued program as time allowed, adjusting exercises and provided cueing with Total Gym Squats due to heel elevation. Added resistance band to mat program. Stressed gastroc/soleus stretching and decreasing frequency of heel raises at home due to significant tightness noted. [] No change. [] Other:  [x] Patient would continue to benefit from skilled physical therapy services in order to:  improve strenght, ROM, flexibility and improve her gait and functional mobility to return to normal ADLs and work       Goals  MET NOT MET ON-  GOING  Details   Date Addressed:           STG: To be met in 10 treatments            1. ? Pain: Decrease pain levels to 4/10 with ADLs []  []  []      2. ? ROM: Increase LLE flexibility and AROM limitations throughout to equal bilat to reduce difficulty with ADLs []  []  []      3. ? Strength: Increase LLE MMT to 5/5 throughout to ease functional limitations and mobility  []  []  []      4. Independent with Home Exercise Programs []  []  []      5. Patient to ambulate without assistive device in her home  []  []  []        []  []  []      Date Addressed:            LTG: To be met in 20 treatments           1. Improve score on assessment tool KOOS from 68% impairment to less than 20% impairment  []  []  []      2. Reduce pain levels to 2/10 or less with ADLs []  []  []        []  []  []                        Patient goals: decrease pain with activity, return to work         Pt.  Education: [x] Yes  [] No  [x] Reviewed Prior HEP/Ed- calf stretching   Method of Education: [x] Verbal  [x] Demo  [] Written  Comprehension of Education:  [] Verbalizes understanding. [] Demonstrates understanding. [] Needs review. [x] Demonstrates/verbalizes HEP/Ed previously given. Plan: [x] Continue current frequency toward long and short term goals.         Time In: 4:00p    Time Out: 5:05p    Electronically signed by:  Chalo Esquivel PT

## 2023-02-28 ENCOUNTER — HOSPITAL ENCOUNTER (OUTPATIENT)
Dept: PHYSICAL THERAPY | Facility: CLINIC | Age: 53
Setting detail: THERAPIES SERIES
Discharge: HOME | End: 2023-02-28
Payer: COMMERCIAL

## 2023-02-28 PROCEDURE — 97110 THERAPEUTIC EXERCISES: CPT

## 2023-02-28 PROCEDURE — 97140 MANUAL THERAPY 1/> REGIONS: CPT

## 2023-02-28 NOTE — FLOWSHEET NOTE
[x] [facility]  Outpatient Rehabilitation &  Therapy  [facility]   [place]: [phone]  F: [phone]      Physical Therapy Daily Treatment Note    Date:  2023  Patient Name:  [place]    :  1970  MRN: [id number]  Physician: Dr Anthony Ludwig: [facility] (15 remain of 25, hard max, benefit period 4-1 to 3-31)  Medical Diagnosis: LLE TKA Revision        Rehab Codes: O48.436, M62.81 (Muscle Weakness), M62.9 (Disorder of Muscle), M79.1 (Myalgia), Z73.6 (Limitation of ADLs)   Onset date: 1-3-23                 Next 's appt. : 2-15-23  Visit# / total visits:    Cancels/No Shows: 0      Subjective:    Pain:  [x] Yes  [] No Location: LLE knee   Pain Rating: (0-10 scale) /10  Pain altered Tx:  [x] No  [] Yes  Action:  Comments: Patient states she sees Dr. Arnie Sullivan Thursday. Patient states continued nerve pain but overall her knee feels good. Patient has used the rolling pin on her gastroc.        Objective:  Exercise     LLE TKA Knee   DOS 1-3-23 Reps/ Time Weight/ Level Comments             Bike 10'                 Calf stretch Stretch  3x30\"      Hamstring Stretch  3x30\"       Knee flexion stool stretch 3x30\"               SLR  held      Heel Slides  HEP      Clamshells  held [facility]  held     Sidelying hip abduction  held [employer]       4-way hip Band bilateral x15 Green     Band TKE LLE 10x10\" Green     Total Gym Squats  x20 L20    Total Gym heel raises  held L20    Balance Board 5' L2    Lunges held bilat    Step ups+march x10 4\" Unilateral UE support    LLE SLS rebounder held   Volleyball                                    Other: MFR to the L gastroc/soleus muscle via hawkgrips      Treatment Charges: Mins Units   []  Modalities     [x]  Ther Exercise 34 2   [x]  Manual Therapy 10 1   []  Ther Activities     []  Aquatics     []  Vasocompression     []  Other     Total Treatment time 44 3 Assessment: [x] Progressing toward goals. Significant gastroc tightness, therefore, applied manual via hawkgrips with decreased muscle tension post. Educated patient to perform self MFR to gastroc and continue stretching multiple ties a day as well as avoiding heel raises due to increased muscle tension. Focused on knee extension exercises and quad strength this date with fatigue noted. Will continue to address manual at beginning of treatment and advance LE strengthening next visit. [] No change. [] Other:  [x] Patient would continue to benefit from skilled physical therapy services in order to:  improve strenght, ROM, flexibility and improve her gait and functional mobility to return to normal ADLs and work       Goals  MET NOT MET ON-  GOING  Details   Date Addressed:           STG: To be met in 10 treatments            1. ? Pain: Decrease pain levels to 4/10 with ADLs []  []  []      2. ? ROM: Increase LLE flexibility and AROM limitations throughout to equal bilat to reduce difficulty with ADLs []  []  []      3. ? Strength: Increase LLE MMT to 5/5 throughout to ease functional limitations and mobility  []  []  []      4. Independent with Home Exercise Programs []  []  []      5. Patient to ambulate without assistive device in her home  []  []  []        []  []  []      Date Addressed:            LTG: To be met in 20 treatments           1. Improve score on assessment tool KOOS from 68% impairment to less than 20% impairment  []  []  []      2. Reduce pain levels to 2/10 or less with ADLs []  []  []        []  []  []                        Patient goals: decrease pain with activity, return to work         Pt. Education:  [x] Yes  [] No  [x] Reviewed Prior HEP/Ed- calf stretching, avoid performing heel raises due to gastroc tightness   Method of Education: [x] Verbal  [x] Demo  [] Written  Comprehension of Education:  [] Verbalizes understanding. [] Demonstrates understanding.   [] Needs review.  [x] Demonstrates/verbalizes HEP/Ed previously given.       Plan: [x] Continue current frequency toward long and short term goals.        Time In: 10:30am    Time Out: 11:24am    Electronically signed by:  Karina Flood PTA

## 2023-03-02 ENCOUNTER — OFFICE VISIT (OUTPATIENT)
Dept: ORTHOPEDIC SURGERY | Age: 53
End: 2023-03-02

## 2023-03-02 VITALS
RESPIRATION RATE: 16 BRPM | BODY MASS INDEX: 40.48 KG/M2 | OXYGEN SATURATION: 100 % | WEIGHT: 243 LBS | HEIGHT: 65 IN | HEART RATE: 90 BPM

## 2023-03-02 DIAGNOSIS — Z96.652 STATUS POST REVISION OF TOTAL REPLACEMENT OF LEFT KNEE: Primary | ICD-10-CM

## 2023-03-02 DIAGNOSIS — M17.11 PRIMARY OSTEOARTHRITIS OF RIGHT KNEE: ICD-10-CM

## 2023-03-02 DIAGNOSIS — M79.2 NERVE PAIN: ICD-10-CM

## 2023-03-02 DIAGNOSIS — M23.91 INTERNAL DERANGEMENT OF KNEE, RIGHT: ICD-10-CM

## 2023-03-02 DIAGNOSIS — Z96.652 HISTORY OF TOTAL KNEE ARTHROPLASTY, LEFT: ICD-10-CM

## 2023-03-02 PROCEDURE — 99024 POSTOP FOLLOW-UP VISIT: CPT | Performed by: ORTHOPAEDIC SURGERY

## 2023-03-02 RX ORDER — GABAPENTIN 300 MG/1
CAPSULE ORAL
Qty: 150 CAPSULE | Refills: 1 | Status: SHIPPED | OUTPATIENT
Start: 2023-03-02 | End: 2023-04-01

## 2023-03-02 NOTE — LETTER
March 2, 2023       Kayli Keene YOB: 1970   [facility] Date of Visit:  3/2/2023       To Whom It May Concern: It is my medical opinion that Monse Knott should continue current work restrictions through 3/15/2023. She can then return to work on 3/16/2023 with no restrictions. .    If you have any questions or concerns, please don't hesitate to call.     Sincerely,        Jelani Young, DO

## 2023-03-06 ASSESSMENT — ENCOUNTER SYMPTOMS
SHORTNESS OF BREATH: 0
ROS SKIN COMMENTS: NEGATIVE FOR RASH
ABDOMINAL PAIN: 0
EYE DISCHARGE: 0

## 2023-03-06 NOTE — PROGRESS NOTES
[facility] SPORTS [facility]  [address] [address] [facility] [place]  [address] [address]  Dept: [phone]  Dept Fax: [phone]        Postoperative follow-up note    Subjective:   Jimmy Antonio is a 50y.o. year old female who presents to our office today for postoperative followup regarding her   1. Status post revision of total replacement of left knee    2. History of total knee arthroplasty, left    3. Internal derangement of knee, right    4. Primary osteoarthritis of right knee    5. Nerve pain    . Chief Complaint   Patient presents with    Post-Op Check     L TKA DOS 1/3/2023     Abeba Amor is a 82-year-old female who presents the office today for recheck. She underwent revision left total knee arthroplasty with concern for metal hypersensitivity on 1/3/2023. She is very happy with the result. She notes some lateral distal thigh burning on the left since surgery and since her first knee was replaced. She is continuing gabapentin for this. She is doing therapy and slowly transitioning to a home exercise program.  Her tibia pain is still persistent but much better than it was before. She also notes right knee pain. She has had pain now for 2 months. This was injected by Chelsea Zuniga PA-C on 2/7/2023 and that helped a little bit. She notes some catching sensation along the medial aspect of the knee. Review of Systems   Constitutional:  Positive for activity change. Negative for fever. HENT:  Negative for dental problem. Eyes:  Negative for discharge. Respiratory:  Negative for shortness of breath. Cardiovascular:  Negative for chest pain. Gastrointestinal:  Negative for abdominal pain. Genitourinary: Negative. Musculoskeletal:  Positive for arthralgias. Skin:         Negative for rash   Neurological:  Positive for weakness. Psychiatric/Behavioral:  Negative for confusion.       I have reviewed the CC, HPI, ROS, PMH, FHX, Social History, and if not present in this note, I have reviewed in the patient's chart. I agree with the documentation provided by other staff and have reviewed their documentation prior to providing my signature indicating agreement. Objective :   General: Elva Nava is a 50 y.o. female who is alert and oriented and sitting comfortably in our office. Ortho Exam  MS:   Evaluation of the left knee reveals a well-healed midline incision. Range of motion is 3 to 115 degrees. Minimal limp is appreciated. Evaluation of the right knee reveals mild diffuse swelling. Tenderness along the medial joint line is appreciated. No gross instability of the right knee is noted. Increased pain with varus Ghulam's but no palpable click is appreciated. No gross instability of the right knee is noted. Negative hip logroll and Stinchfield test on the right is noted. Motor, sensory, vascular examination right lower extremity is grossly intact without focal deficits. Neuro: alert. oriented  Eyes: Extra-ocular muscles intact  Mouth: Oral mucosa moist. No perioral lesions  Pulm: Respirations unlabored and regular. Skin: warm, well perfused  Psych:   Patient has good fund of knowledge and displays understanging of exam, diagnosis, and plan. Radiology: XR KNEE RIGHT (MIN 4 VIEWS)    Result Date: 2/15/2023  KNEE X-RAY   4 views of the right knee including AP, tunnel, and lateral in the upright position, and skyline views reveal anatomic alignment with no fracture or dislocation. Kellgren grade II/III changes of osteoarthritis (joint space narrowing, osteophyte, subchondral sclerosis, bony deformity/cyst) of the tricompartment(s). No osseous loose bodies. No bony erosion or periosteal reaction. No soft tissue masses. Impression: Degenerative changes as described above of the right knee. Assessment:      1.  Status post revision of total replacement of left knee 2. History of total knee arthroplasty, left    3. Internal derangement of knee, right    4. Primary osteoarthritis of right knee    5. Nerve pain           Plan:      Patient continued to improve with her left knee. She is going to continue home exercise program.  I plan to see her back in 2 months or sooner as necessary. As far as the right knee goes an MRI will be ordered for further evaluation. She does have advanced degenerative changes medially and trying to figure out whether knee arthroscopy or unicompartmental knee arthroplasty or total knee arthroplasty might be of benefit in the future. I plan to see the patient back in 2 months for both knees or sooner as necessary. Follow up: Return in about 2 months (around 5/2/2023). Orders Placed This Encounter   Medications    gabapentin (NEURONTIN) 300 MG capsule     Sig: take 2 tabs at 8am, 1 tab at 2pm & 2 tabs at night. Dispense:  150 capsule     Refill:  1       Orders Placed This Encounter   Procedures    MRI KNEE RIGHT WO CONTRAST     Standing Status:   Future     Standing Expiration Date:   3/2/2024     Order Specific Question:   Reason for exam:     Answer:   DX     Order Specific Question:   What is the sedation requirement? Answer:   None       This note is created with the assistance of a speech recognition program.  While intending to generate a document that actually reflects the content of the visit, the document can still have some errors including those of syntax and sound a like substitutions which may escape proof reading.   In such instances, actual meaning can be extrapolated by contextual diversion      Electronically signed by Sonya Funez DO, FAOAO on 3/6/2023 at 7:25 AM

## 2023-03-08 ENCOUNTER — HOSPITAL ENCOUNTER (OUTPATIENT)
Dept: PHYSICAL THERAPY | Facility: CLINIC | Age: 53
Setting detail: THERAPIES SERIES
Discharge: HOME | End: 2023-03-08
Payer: COMMERCIAL

## 2023-03-08 PROCEDURE — 97110 THERAPEUTIC EXERCISES: CPT

## 2023-03-08 NOTE — FLOWSHEET NOTE
[x] [facility]  Outpatient Rehabilitation &  Therapy  [facility]   [place]: [phone]  F: [phone]      Physical Therapy Daily Treatment Note    Date:  3/8/2023  Patient Name:  Maren Cortez    :  1970  MRN: [id number]  Physician: Dr Whitney Rosen: [facility] (15 remain of 25, hard max, benefit period 4 to 3-31)  Medical Diagnosis: LLE TKA Revision        Rehab Codes: D79.697, M62.81 (Muscle Weakness), M62.9 (Disorder of Muscle), M79.1 (Myalgia), Z73.6 (Limitation of ADLs)   Onset date: 1-3-23                 Next 's appt. : 2-15-23  Visit# / total visits:    Cancels/No Shows: 0      Subjective:    Pain:  [] Yes  [x] No Location: LLE knee   Pain Rating: (0-10 scale) 0/10  Pain altered Tx:  [x] No  [] Yes  Action:  Comments: Patient has an MRI of her R knee on Monday. She understands that she only has one more visit.        Objective:  Exercise     LLE TKA Knee   DOS 1-3-23 Reps/ Time Weight/ Level Comments             Bike 10'                 Calf stretch Stretch  3x30\"      Hamstring Stretch  3x30\"       Knee flexion stool stretch                SLR  held      Heel Slides  HEP      Clamshells  held [employer]  held     Sidelying hip abduction  held [employer]       4-way hip Band bilateral x15 Green     Band TKE LLE  Green     Total Gym Squats  x20 L20    Total Gym heel raises   L20    Balance Board 5' L2    Lunges held bilat    Step ups x15 6\" Unilateral UE support    Eccentrically controlling reverse step up  x15   R only, difficult on the L    LLE SLS rebounder     Volleyball     Lateral lunge slides  x15   Small range   Cones 2x ea   Contralateral leg behind for stability, cones on 6\" step    TRX Squats  x15  Toes up      Other: MFR to the L gastroc/soleus muscle via hawkgrips- not today       Treatment Charges: Mins Units   []  Modalities     [x]  Ther Exercise 40 3   []  Manual Therapy     []  Ther Activities     []  Aquatics     []  Vasocompression     []  Other     Total Treatment time 40 3       Assessment: [x] Progressing toward goals. Improved gastrocnemius mobility, she defers need for the [employer] today. Focus was on strength program, addressing gluteal weakness and eccentric quadriceps weakness. Patient will have her last treatment next week. [] No change. [] Other:  [x] Patient would continue to benefit from skilled physical therapy services in order to:  improve strenght, ROM, flexibility and improve her gait and functional mobility to return to normal ADLs and work       Goals  MET NOT MET ON-  GOING  Details   Date Addressed:           STG: To be met in 10 treatments            1. ? Pain: Decrease pain levels to 4/10 with ADLs []  []  []      2. ? ROM: Increase LLE flexibility and AROM limitations throughout to equal bilat to reduce difficulty with ADLs []  []  []      3. ? Strength: Increase LLE MMT to 5/5 throughout to ease functional limitations and mobility  []  []  []      4. Independent with Home Exercise Programs []  []  []      5. Patient to ambulate without assistive device in her home  []  []  []        []  []  []      Date Addressed:            LTG: To be met in 20 treatments           1. Improve score on assessment tool KOOS from 68% impairment to less than 20% impairment  []  []  []      2. Reduce pain levels to 2/10 or less with ADLs []  []  []        []  []  []                        Patient goals: decrease pain with activity, return to work         Pt. Education:  [x] Yes  [] No  [x] Reviewed Prior HEP/Ed- calf stretching, avoid performing heel raises due to gastroc tightness   Method of Education: [x] Verbal  [x] Demo  [] Written  Comprehension of Education:  [] Verbalizes understanding. [] Demonstrates understanding. [] Needs review. [x] Demonstrates/verbalizes HEP/Ed previously given. Plan: [x] Continue current frequency toward long and short term goals. Time In: 8:00am    Time Out: 8:50am    Electronically signed by:  Moris Wallis PT

## 2023-03-13 ENCOUNTER — HOSPITAL ENCOUNTER (OUTPATIENT)
Dept: MRI IMAGING | Facility: CLINIC | Age: 53
Discharge: HOME | End: 2023-03-15
Payer: COMMERCIAL

## 2023-03-13 DIAGNOSIS — M17.11 PRIMARY OSTEOARTHRITIS OF RIGHT KNEE: ICD-10-CM

## 2023-03-13 DIAGNOSIS — M23.91 INTERNAL DERANGEMENT OF KNEE, RIGHT: ICD-10-CM

## 2023-03-13 PROCEDURE — 73721 MRI JNT OF LWR EXTRE W/O DYE: CPT

## 2023-03-14 ENCOUNTER — HOSPITAL ENCOUNTER (OUTPATIENT)
Dept: PHYSICAL THERAPY | Facility: CLINIC | Age: 53
Setting detail: THERAPIES SERIES
Discharge: HOME | End: 2023-03-14
Payer: COMMERCIAL

## 2023-03-14 PROCEDURE — 97110 THERAPEUTIC EXERCISES: CPT

## 2023-03-14 NOTE — DISCHARGE SUMMARY
[x] [facility]  Outpatient Rehabilitation &  Therapy  [facility]   [place]: [phone]  F: [phone]      Physical Therapy Daily Treatment Note    Date:  3/14/2023  Patient Name:  Padmini Reyes    :  1970  MRN: [id number]  Physician: Dr Vera Getting: [facility] (15 remain of 25, hard max, benefit period 4-1 to 3-31)  Medical Diagnosis: LLE TKA Revision        Rehab Codes: O97.325, M62.81 (Muscle Weakness), M62.9 (Disorder of Muscle), M79.1 (Myalgia), Z73.6 (Limitation of ADLs)   Onset date: 1-3-23                 Next 's appt. : 2-15-23  Visit# / total visits:    Cancels/No Shows: 0      Subjective:    Pain:  [] Yes  [x] No Location: LLE knee   Pain Rating: (0-10 scale) 0/10  Pain altered Tx:  [x] No  [] Yes  Action:  Comments: Patient reports she is ready for this to be the last visit. She states that she feels like she has made good improvements in ROM and strength in the LLE and can continue her HEP on her own.        Objective:  Exercise     LLE TKA Knee   DOS 1-3-23 Reps/ Time Weight/ Level Comments             Bike 10'                 Calf stretch Stretch  3x30\"      Hamstring Stretch  3x30\"       Knee flexion stool stretch                SLR  held      Heel Slides  HEP      Clamshells  held [facility]  held     Sidelying hip abduction  held [employer]       4-way hip Band bilateral x15 Green     Band TKE LLE  Green     Total Gym Squats  x20 L20    Total Gym heel raises   L20    Balance Board 5' L2    Lunges held bilat    Step ups x15 6\" Unilateral UE support    Eccentrically controlling reverse step up  x15   R only, difficult on the L    LLE SLS rebounder     Volleyball     Lateral lunge slides  x15   Small range   Cones 2x ea   Contralateral leg behind for stability, cones on 6\" step    TRX Squats  x15  Toes up      Other: MFR to the L gastroc/soleus muscle via hawkgrips- not today STRENGTH  1/9/23   3/14/23     Left Right Left Right   Hip Flex 4-   4+    Ext 4-   4-    ABD 4   4    ADD 4   4    Knee Flex 4-   4+    Ext 4-   4+    Ankle DF 5   5    PF 5   5    INV 5   5    EVER 5   5                          ROM  ° A/P  1/9/23     3/14/23     Left Right     Knee Flex 84/ 90   125    Ext 8/5   2                Treatment Charges: Mins Units   []  Modalities     [x]  Ther Exercise 25 2   []  Manual Therapy     []  Ther Activities     []  Aquatics     []  Vasocompression     []  Other     Total Treatment time 25 2       Assessment: [x]  Patient will be discharged this date due to patients ability to complete HEP on her own. Patient has surgical plans for later in the year and would like to save remaining visits for after surgery. Patient will be able to progress strength and ROM of the LLE with continuation of HEP. [] No change. [] Other:  [x] Patient is being discharged this date for ability to completer her HEP on her own. Goals  MET NOT MET ON-  GOING  Details   Date Addressed: 3/14/23           STG: To be met in 10 treatments            1. ? Pain: Decrease pain levels to 4/10 with ADLs [x]  []  []      2. ? ROM: Increase LLE flexibility and AROM limitations throughout to equal bilat to reduce difficulty with ADLs [x]  []  []  Patient AROM has increased from the initial evaluation. KF: 125  KE: 2 from 0   3. ? Strength: Increase LLE MMT to 5/5 throughout to ease functional limitations and mobility  []  [x]  []  Patient still has residual weakness in the LLE. 4. Independent with Home Exercise Programs [x]  []  []      5. Patient to ambulate without assistive device in her home  [x]  []  []              Date Addressed: 3/14/23           LTG: To be met in 20 treatments           1.  Improve score on assessment tool KOOS from 68% impairment to less than 20% impairment  []  [x]  []  KOOS scorin% impairment   2. Reduce pain levels to 2/10 or less with ADLs [x]  []  [] Patient goals: decrease pain with activity, return to work         Pt. Education:  [x] Yes  [] No  [x] Reviewed Prior HEP/Ed; Continue with current HEP  Method of Education: [x] Verbal  [x] Demo  [] Written  Comprehension of Education:  [] Verbalizes understanding. [] Demonstrates understanding. [] Needs review. [x] Demonstrates/verbalizes HEP/Ed previously given. Plan: [x] Continue current frequency toward long and short term goals.         Time In: 1339  Time Out: Mohit 89    Electronically signed by:  Kaia Hernandez, Student Physical Therapist  This document has been reviewed and approved by ALEXANDER Johnson, PT

## 2023-03-20 NOTE — PROGRESS NOTES
[address] [place] [address] AND SPORTS MEDICINE  [facility]  [address] [address]  Dept: [phone]  Dept Fax: [phone]        Ambulatory Follow Up      Subjective:   Clinton Carbajal is a 50y.o. year old female who presents to our office today for routine followup regarding her   1. Primary osteoarthritis of right knee    2. Effusion of right knee    . Chief Complaint   Patient presents with    Knee Pain     R knee pain (LI 2/7/22)       HPI Clinton Carbajal  is a 50 y.o.  female who presents today in follow for right knee pain. The patient was last seen on 2/7/2023 underwent treatment in the form of aspiration and cortisone injection. She had an MRI of her right knee on 3/13/2023 and is here for review. Some relief from the last cortisone injection. She is returning to work and is concerned about being able to get around due to her right knee not her left. Review of Systems   Constitutional:  Positive for activity change. Negative for appetite change, fatigue and fever. Respiratory: Negative. Negative for apnea, cough, chest tightness and shortness of breath. Cardiovascular: Negative. Negative for chest pain, palpitations and leg swelling. Gastrointestinal:  Negative for abdominal distention, abdominal pain, constipation, diarrhea, nausea and vomiting. Genitourinary:  Negative for difficulty urinating, dysuria and hematuria. Musculoskeletal:  Positive for arthralgias, gait problem and joint swelling. Negative for myalgias. Skin:  Negative for color change and rash. Neurological:  Negative for dizziness, weakness, numbness and headaches. Psychiatric/Behavioral:  Negative for sleep disturbance. Objective :   Resp 14   Ht 5' 5\" (1.651 m)   Wt 240 lb (108.9 kg)   BMI 39.94 kg/m²  Body mass index is 39.94 kg/m².   General: Clinton Carbajal is a 50 y.o. female who is alert and oriented and sitting

## 2023-03-21 ENCOUNTER — OFFICE VISIT (OUTPATIENT)
Dept: ORTHOPEDIC SURGERY | Age: 53
End: 2023-03-21

## 2023-03-21 VITALS — RESPIRATION RATE: 14 BRPM | HEIGHT: 65 IN | BODY MASS INDEX: 39.99 KG/M2 | WEIGHT: 240 LBS

## 2023-03-21 DIAGNOSIS — M25.461 EFFUSION OF RIGHT KNEE: ICD-10-CM

## 2023-03-21 DIAGNOSIS — M17.11 PRIMARY OSTEOARTHRITIS OF RIGHT KNEE: Primary | ICD-10-CM

## 2023-03-21 RX ORDER — MELOXICAM 15 MG/1
15 TABLET ORAL DAILY
Qty: 30 TABLET | Refills: 0 | Status: SHIPPED | OUTPATIENT
Start: 2023-03-21 | End: 2023-04-20

## 2023-03-21 RX ORDER — LIDOCAINE HYDROCHLORIDE 10 MG/ML
6 INJECTION, SOLUTION INFILTRATION; PERINEURAL ONCE
Status: COMPLETED | OUTPATIENT
Start: 2023-03-21 | End: 2023-03-21

## 2023-03-21 RX ORDER — METHYLPREDNISOLONE ACETATE 80 MG/ML
80 INJECTION, SUSPENSION INTRA-ARTICULAR; INTRALESIONAL; INTRAMUSCULAR; SOFT TISSUE ONCE
Status: COMPLETED | OUTPATIENT
Start: 2023-03-21 | End: 2023-03-21

## 2023-03-21 RX ADMIN — METHYLPREDNISOLONE ACETATE 80 MG: 80 INJECTION, SUSPENSION INTRA-ARTICULAR; INTRALESIONAL; INTRAMUSCULAR; SOFT TISSUE at 18:15

## 2023-03-21 RX ADMIN — LIDOCAINE HYDROCHLORIDE 6 ML: 10 INJECTION, SOLUTION INFILTRATION; PERINEURAL at 18:15

## 2023-03-21 ASSESSMENT — ENCOUNTER SYMPTOMS
VOMITING: 0
COUGH: 0
ABDOMINAL DISTENTION: 0
COLOR CHANGE: 0
CHEST TIGHTNESS: 0
ABDOMINAL PAIN: 0
DIARRHEA: 0
CONSTIPATION: 0
SHORTNESS OF BREATH: 0
RESPIRATORY NEGATIVE: 1
APNEA: 0
NAUSEA: 0

## 2023-03-22 ENCOUNTER — TELEPHONE (OUTPATIENT)
Dept: ORTHOPEDIC SURGERY | Age: 53
End: 2023-03-22

## 2023-03-24 ENCOUNTER — TELEPHONE (OUTPATIENT)
Dept: ORTHOPEDIC SURGERY | Age: 53
End: 2023-03-24

## 2023-03-24 ENCOUNTER — PATIENT MESSAGE (OUTPATIENT)
Dept: ORTHOPEDIC SURGERY | Age: 53
End: 2023-03-24

## 2023-03-24 NOTE — TELEPHONE ENCOUNTER
I honestly think time is what is going to help the most.  We could try to up the amount of gabapentin to see if it helps a little more. You could up the middle of the day dose to 2 pills to see if that helps. She can also try continuing to stimulate it with hot, cold, gentle scratching, tapping to the area.

## 2023-03-24 NOTE — TELEPHONE ENCOUNTER
----- Message from [facility] sent at 3/24/2023  9:26 AM EDT -----  Regarding: FW: Nerve pain    ----- Message -----  From: [place] Citizen  Sent: 3/24/2023   8:50 AM EDT  To: Donny Carrillour[facility] Ortho/Sport Clinical Staff  Subject: Nerve pain                                       Ive been taking gabapentin for a few weeks now. I think it helps 75% of the time but I still have severe burning and pain in the outside thigh to knee area on my revised knee. Anything else I could try?

## 2023-03-30 DIAGNOSIS — Z96.652 STATUS POST REVISION OF TOTAL REPLACEMENT OF LEFT KNEE: Primary | ICD-10-CM

## 2023-03-30 RX ORDER — GABAPENTIN 300 MG/1
600 CAPSULE ORAL 3 TIMES DAILY
Qty: 180 CAPSULE | Refills: 0 | Status: SHIPPED | OUTPATIENT
Start: 2023-03-30 | End: 2023-04-29

## 2023-03-30 NOTE — TELEPHONE ENCOUNTER
Regarding: Nerve pain  ----- Message from Vinita Beckham MA sent at 3/30/2023  2:49 PM EDT -----       ----- Message from [address] [place] [address] to Toma Ardon DO sent at 3/30/2023  1:11 PM -----   Is it possible to get the script for the higher dose today of Gabapentin? My insurance restarts tomorrow and I will have to pay 100%.      ----- Message -----       Willie Mallory       Sent:3/26/2023  9:31 PM EDT         To:Kat Akbar    Subject:Nerve pain    yes      ----- Message -----       From:Kat Akbar       Sent:3/24/2023  1:22 PM EDT         To:Patient Medical Advice Request Message List    Subject:Nerve pain    Could we up the dosage and I will do 2 pills in the afternoon until current pills run out? I have been icing, putting pressure, scratching, and tapping.      ----- Message -----       From:BREE MARS       Sent:3/24/2023  1:08 PM EDT         To:Kat Akbar    Subject:Nerve pain    Good afternoon,    Please refer to the message below from [place], [place]    I honestly think time is what is going to help the most.  We could try to up the amount of gabapentin to see if it helps a little more. You could up the middle of the day dose to 2 pills to see if that helps. She can also try continuing to stimulate it with hot, cold, gentle scratching, tapping to the area. Thank you.      ----- Message -----       From:Kat Akbar       Sent:3/24/2023  8:50 AM EDT         To:Dr. Toma Ardon    Subject:Nerve pain    Margetie Crown been taking gabapentin for a few weeks now. I think it helps 75% of the time but I still have severe burning and pain in the outside thigh to knee area on my revised knee. Anything else I could try?

## 2023-05-04 ENCOUNTER — OFFICE VISIT (OUTPATIENT)
Dept: ORTHOPEDIC SURGERY | Age: 53
End: 2023-05-04

## 2023-05-04 VITALS — RESPIRATION RATE: 14 BRPM | BODY MASS INDEX: 41.65 KG/M2 | WEIGHT: 250 LBS | HEIGHT: 65 IN

## 2023-05-04 DIAGNOSIS — M17.11 PRIMARY OSTEOARTHRITIS OF RIGHT KNEE: ICD-10-CM

## 2023-05-04 DIAGNOSIS — M25.50 MULTIPLE JOINT PAIN: Primary | ICD-10-CM

## 2023-05-04 DIAGNOSIS — Z96.652 STATUS POST REVISION OF TOTAL REPLACEMENT OF LEFT KNEE: ICD-10-CM

## 2023-05-07 ASSESSMENT — ENCOUNTER SYMPTOMS
SHORTNESS OF BREATH: 0
ABDOMINAL PAIN: 0
ROS SKIN COMMENTS: NEGATIVE FOR RASH
EYE DISCHARGE: 0

## 2023-05-23 ENCOUNTER — TELEPHONE (OUTPATIENT)
Dept: ORTHOPEDIC SURGERY | Age: 53
End: 2023-05-23

## 2023-05-23 NOTE — TELEPHONE ENCOUNTER
Pt is S/P LEFT TKA from 1/3/2023 . Her dental office called - [address] - re: protocol for dental appt - following a Total joint replacement, and is asking if we can send a letter stating ok for pt to have routine cleaning 6 mo after, to add to her chart in their office.

## 2023-05-23 NOTE — TELEPHONE ENCOUNTER
Writer called and spoke with Rachell from [facility] requesting them to fax us the form to fill out. She is faxing form over now. Once received writer will fill out form and fax back. Thank you.

## 2023-06-28 ENCOUNTER — HOSPITAL ENCOUNTER (OUTPATIENT)
Facility: CLINIC | Age: 53
Discharge: HOME | End: 2023-06-28
Payer: COMMERCIAL

## 2023-06-28 DIAGNOSIS — M25.50 MULTIPLE JOINT PAIN: ICD-10-CM

## 2023-06-28 LAB
ALBUMIN SERPL-MCNC: 4.3 G/DL (ref 3.5–5.2)
ALBUMIN/GLOB SERPL: 1.4 {RATIO} (ref 1–2.5)
ALP SERPL-CCNC: 113 U/L (ref 35–104)
ALT SERPL-CCNC: 13 U/L (ref 5–33)
ANION GAP SERPL CALCULATED.3IONS-SCNC: 16 MMOL/L (ref 9–17)
ASO AB SERPL-ACNC: 66.3 IU/ML (ref 0–200)
AST SERPL-CCNC: 13 U/L
BILIRUB SERPL-MCNC: 0.3 MG/DL (ref 0.3–1.2)
BUN SERPL-MCNC: 20 MG/DL (ref 6–20)
CALCIUM SERPL-MCNC: 9.3 MG/DL (ref 8.6–10.4)
CHLORIDE SERPL-SCNC: 100 MMOL/L (ref 98–107)
CO2 SERPL-SCNC: 20 MMOL/L (ref 20–31)
CREAT SERPL-MCNC: 0.5 MG/DL (ref 0.5–0.9)
CREAT UR-MCNC: 140.5 MG/DL (ref 28–217)
CRP SERPL HS-MCNC: 12.7 MG/L (ref 0–5)
ERYTHROCYTE [SEDIMENTATION RATE] IN BLOOD BY WESTERGREN METHOD: 20 MM/HR (ref 0–30)
EST. AVERAGE GLUCOSE BLD GHB EST-MCNC: 160 MG/DL
GFR SERPL CREATININE-BSD FRML MDRD: >60 ML/MIN/1.73M2
GLUCOSE SERPL-MCNC: 210 MG/DL (ref 70–99)
HBA1C MFR BLD: 7.2 % (ref 4–6)
MICROALBUMIN UR-MCNC: 22 MG/L
MICROALBUMIN/CREAT UR-RTO: 16 MCG/MG CREAT
POTASSIUM SERPL-SCNC: 4.3 MMOL/L (ref 3.7–5.3)
PROT SERPL-MCNC: 7.3 G/DL (ref 6.4–8.3)
RHEUMATOID FACT SER NEPH-ACNC: <10 IU/ML
SODIUM SERPL-SCNC: 136 MMOL/L (ref 135–144)

## 2023-06-28 PROCEDURE — 85652 RBC SED RATE AUTOMATED: CPT

## 2023-06-28 PROCEDURE — 36415 COLL VENOUS BLD VENIPUNCTURE: CPT

## 2023-06-28 PROCEDURE — 82570 ASSAY OF URINE CREATININE: CPT

## 2023-06-28 PROCEDURE — 80053 COMPREHEN METABOLIC PANEL: CPT

## 2023-06-28 PROCEDURE — 86140 C-REACTIVE PROTEIN: CPT

## 2023-06-28 PROCEDURE — 82043 UR ALBUMIN QUANTITATIVE: CPT

## 2023-06-28 PROCEDURE — 86235 NUCLEAR ANTIGEN ANTIBODY: CPT

## 2023-06-28 PROCEDURE — 83036 HEMOGLOBIN GLYCOSYLATED A1C: CPT

## 2023-06-28 PROCEDURE — 86063 ANTISTREPTOLYSIN O SCREEN: CPT

## 2023-06-28 PROCEDURE — 86431 RHEUMATOID FACTOR QUANT: CPT

## 2023-06-30 LAB — ENA JO1 IGG SER-ACNC: <0.4 U/ML

## 2023-07-20 ENCOUNTER — HOSPITAL ENCOUNTER (OUTPATIENT)
Dept: DIABETES SERVICES | Age: 53
Setting detail: THERAPIES SERIES
Discharge: HOME | End: 2023-07-20
Payer: COMMERCIAL

## 2023-07-20 PROCEDURE — G0108 DIAB MANAGE TRN  PER INDIV: HCPCS

## 2023-07-20 SDOH — ECONOMIC STABILITY: FOOD INSECURITY: ADDITIONAL INFORMATION: NO

## 2023-07-20 ASSESSMENT — PROBLEM AREAS IN DIABETES QUESTIONNAIRE (PAID)
FEELING THAT DIABETES IS TAKING UP TOO MUCH OF YOUR MENTAL AND PHYSICAL ENERGY EVERY DAY: 0
COPING WITH COMPLICATIONS OF DIABETES: 4
WORRYING ABOUT THE FUTURE AND THE POSSIBILITY OF SERIOUS COMPLICATIONS: 4
FEELING DEPRESSED WHEN YOU THINK ABOUT LIVING WITH DIABETES: 0
FEELING SCARED WHEN YOU THINK ABOUT LIVING WITH DIABETES: 4
PAID-5 TOTAL SCORE: 12

## 2023-07-20 NOTE — PROGRESS NOTES
booklet -pages 4, 14-19, 22-23        View: Understanding Type 2 Diabetes from Animated Diabetes Patient https://youtu. be/[email or web address]    [] Healthy Eating and Meal planning  How to Thrive: A guide for Your journey with Diabetes - ADA booklet - pages 20- 21 & 42-43  Handouts: Smarter snacking, Lowdown on low - carb diets, Supermarket savvy, Ready, set, start counting, Reading a nutrition fact label, 7 ways to size up your servings    []   Medications and Prevention of Complications      Book How to Thrive: A guide for Your Journey with Diabetes - ADA booklet -  pages 6-7, 12-13, 24-27 & 28 -35  Handouts: Know your numbers, Vaccinations, Type 2 diabetes and the role of GLP- 1, How to care for your teeth and gums, Caring for your feet, How to pick the right shoes  Individualized Diabetes report card     []  Diet Follow Up- CHO counting and  planning for sick days, holiday, vacations   How to Thrive: A guide for Your journey with Diabetes - ADA booklet  - pages 43- 37  Diabetes Food hub www. diabetesfoodhub.org   Handouts: Sick day rules, Dining out guide, Diabetes and alcohol, Traveling with diabetes, Diabetes disaster preparedness plan, Holidays and special occasions                                                            []  Self care and goal review -  3 month follow -    Handouts: AADE7 Self care behaviors work sheets and personal goal setting worksheet review, DSME support options on line     []Self-Management  Gestational - RN class -Resource materials sent out : care booklet - \" Gestational Diabetes Mellitus ( GDM) toolkit form [place] gestational diabetes postpartum care learning collaborative 2019. \"Simple Guidelines for meal planning with gestational diabetes. SMBG sheets to fax back to MFM weekly. BD  healthy injection site selection and rotation with 6 mm insulin syringe and 4 mm pen needle. Gestational diabetes handout from [facility] 2016.  Did you have gestational diabetes when you were

## 2023-08-09 ENCOUNTER — HOSPITAL ENCOUNTER (OUTPATIENT)
Dept: DIABETES SERVICES | Age: 53
Setting detail: THERAPIES SERIES
Discharge: HOME | End: 2023-08-09
Payer: COMMERCIAL

## 2023-08-09 PROCEDURE — G0108 DIAB MANAGE TRN  PER INDIV: HCPCS

## 2023-08-09 NOTE — PROGRESS NOTES
work sheets and personal goal setting worksheet review, DSME support options on line     []Self-Management  Gestational - RN class -Resource materials sent out : care booklet - \" Gestational Diabetes Mellitus ( GDM) toolkit form [place] gestational diabetes postpartum care learning collaborative 2019. \"Simple Guidelines for meal planning with gestational diabetes. SMBG sheets to fax back to [facility] weekly. BD  healthy injection site selection and rotation with 6 mm insulin syringe and 4 mm pen needle. Gestational diabetes handout from [facility] 2016. Did you have gestational diabetes when you were pregnant? Handout from [facility]  April 2014    []Self-Management Gestational - RD class - My Food Plan for Gestational diabetes    []Glucose Meter     []Insulin Kit     []Other      Encounter Type Date Start Time End Time Comments No Show Dates   Assessment 7-20-23BB   3pm 4pm   Face to face    Class 1 - Understanding diabetes 8-9-23BB 3:10pm 4:10pm  Face to face    Class 2- Nutrition and diabetes          Class 3 - Preventing Complications        Class 4 -  In depth Nutrition and sick day care        Class 5 - 3 month follow up / goal reassessment        Gestational - RN         Gestational - RD        Individual MNT         Shared Med Appt         Yearly Follow-up        Meter Instrx      How to Measure Your Blood Sugar - [facility] Patient Education  https://youtu. be/[email or web address]    Insulin Instrx      []Pen  []Vial & Syringe   BD Diabetes Care: How to Inject Insulin with a Pen Needle  https://youtu. be/[email or web address]    Diabetes Care: How to Inject Insulin with a Syringe  https://youtu. be/9uSSBu-5eSY       DSMS Support :   [] MNT      [] Annual update     [] Starting Fresh  adults living with diabetes or pre diabetes.  [address] [facility] [address] [facility], [address] [place] [address] [phone] call for dates    []  Diabetes Group at  [address] [place] [address] 6 week diabetes

## 2023-08-24 ENCOUNTER — HOSPITAL ENCOUNTER (OUTPATIENT)
Dept: DIABETES SERVICES | Age: 53
Setting detail: THERAPIES SERIES
Discharge: HOME | End: 2023-08-24
Payer: COMMERCIAL

## 2023-08-24 VITALS — WEIGHT: 242 LBS | BODY MASS INDEX: 40.27 KG/M2

## 2023-08-24 DIAGNOSIS — E11.9 TYPE 2 DIABETES MELLITUS WITHOUT COMPLICATION, WITHOUT LONG-TERM CURRENT USE OF INSULIN (HCC): Primary | ICD-10-CM

## 2023-08-24 PROCEDURE — G0108 DIAB MANAGE TRN  PER INDIV: HCPCS

## 2023-08-24 RX ORDER — SEMAGLUTIDE 1.34 MG/ML
INJECTION, SOLUTION SUBCUTANEOUS
COMMUNITY

## 2023-08-24 NOTE — PROGRESS NOTES
Diabetes Self- Management Education Program Assessment -   Also see Diabetic Screening  Patient, Aurelio Knutson,  here for diabetes self-management education  visit/ assessment. Today's visit was in an individual setting. MEDICAL HISTORY:  Past Medical History:   Diagnosis Date    Arthritis     Bladder spasms     Depression     Diabetes mellitus ([address])     pre-diabetic    Fracture     left humerus/ Fall    GERD (gastroesophageal reflux disease)     Hypertension     Kidney stones     Pain     left humerus    Personal history of other medical treatment     Pt. denies ever being Diabetic, states she was put on Amaryl due to high A1C before a previous surgery. Pt. states PCP will be taking her off this med at next visit    Seasonal allergies     Snores     denies apnea    Under care of team     urology/ Dr. Truong Nolasco last seen 6-30-21    Urinary urgency     UTI (urinary tract infection)     frequent    Wears glasses     Wellness examination     PCP Armin Huber MD/arley/ last seen 7-9-21     Family History   Problem Relation Age of Onset    No Known Problems Mother     No Known Problems Father      Morphine sulfate [morphine], Meperidine hcl, and Sulfa antibiotics   There is no immunization history for the selected administration types on file for this patient. Current Medications  Current Outpatient Medications   Medication Sig Dispense Refill    gabapentin (NEURONTIN) 300 MG capsule Take 2 capsules by mouth 3 times daily for 30 days. take 2 tabs at 8am, 2 tab at 2pm & 2 tabs at night. 180 capsule 0    meloxicam (MOBIC) 15 MG tablet Take 1 tablet by mouth daily 30 tablet 0    gabapentin (NEURONTIN) 300 MG capsule take 2 tabs at 8am, 1 tab at 2pm & 2 tabs at night.  150 capsule 1    vibegron (GEMTESA) 75 MG TABS tablet Take 75 mg by mouth daily      mirabegron (MYRBETRIQ) 25 MG TB24 Take 25 mg by mouth daily      docusate sodium (COLACE) 100 MG capsule Take 1 capsule by mouth 2 times daily as needed

## 2023-08-24 NOTE — FLOWSHEET NOTE
[] [facility]) [facility] &  Therapy  [address]    [phone]  F: [phone]   [] [address]  [facility] [address]   [address]  P: [phone]  F: [phone]  [] [address] &  Therapy  [address] [facility] [address]  P: [phone]  F: [phone] [] [address]  P: [phone]  F: [phone]  [x] [address]  [address] [facility] [address]   [address]   [place]: [phone]  F: [phone]   [] [address]  [place]: [phone]   F: [phone]     Physical Therapy Cancel/No Show note    Date: 2023  Patient: Clayton Tavarez  : 1970  MRN: [id number]    Cancels/No Shows to date:     For today's appointment patient:    [x]  Cancelled    [] Rescheduled appointment    [] No-show     Reason given by patient:    []  Patient ill    []  Conflicting appointment    [] No transportation      [] Conflict with work    [x] No reason given    [] Weather related    [] COVID-19    [] Other:      Comments:        [x] Next appointment was confirmed    Electronically signed by: Pao Salmeron, PT no

## 2023-10-24 ENCOUNTER — HOSPITAL ENCOUNTER (OUTPATIENT)
Facility: CLINIC | Age: 53
Discharge: HOME | End: 2023-10-24
Payer: COMMERCIAL

## 2023-10-24 LAB
ALBUMIN SERPL-MCNC: 4.5 G/DL (ref 3.5–5.2)
ALBUMIN/GLOB SERPL: 1.4 {RATIO} (ref 1–2.5)
ALP SERPL-CCNC: 107 U/L (ref 35–104)
ALT SERPL-CCNC: 15 U/L (ref 5–33)
ANION GAP SERPL CALCULATED.3IONS-SCNC: 11 MMOL/L (ref 9–17)
AST SERPL-CCNC: 15 U/L
BASOPHILS # BLD: 0.06 K/UL (ref 0–0.2)
BASOPHILS NFR BLD: 1 % (ref 0–2)
BILIRUB SERPL-MCNC: 0.3 MG/DL (ref 0.3–1.2)
BUN SERPL-MCNC: 17 MG/DL (ref 6–20)
CALCIUM SERPL-MCNC: 9.5 MG/DL (ref 8.6–10.4)
CHLORIDE SERPL-SCNC: 101 MMOL/L (ref 98–107)
CO2 SERPL-SCNC: 25 MMOL/L (ref 20–31)
CREAT SERPL-MCNC: 0.6 MG/DL (ref 0.5–0.9)
CREAT UR-MCNC: 144.6 MG/DL (ref 28–217)
CRP SERPL HS-MCNC: 6.5 MG/L (ref 0–5)
EOSINOPHIL # BLD: 0.2 K/UL (ref 0–0.44)
EOSINOPHILS RELATIVE PERCENT: 2 % (ref 1–4)
ERYTHROCYTE [DISTWIDTH] IN BLOOD BY AUTOMATED COUNT: 14.1 % (ref 11.8–14.4)
ERYTHROCYTE [SEDIMENTATION RATE] IN BLOOD BY PHOTOMETRIC METHOD: 17 MM/HR (ref 0–30)
FOLATE SERPL-MCNC: 9.8 NG/ML (ref 4.8–24.2)
GFR SERPL CREATININE-BSD FRML MDRD: >60 ML/MIN/1.73M2
GLUCOSE SERPL-MCNC: 134 MG/DL (ref 70–99)
HCT VFR BLD AUTO: 45.9 % (ref 36.3–47.1)
HGB BLD-MCNC: 14.5 G/DL (ref 11.9–15.1)
HIV 1+2 AB+HIV1 P24 AG SERPL QL IA: NONREACTIVE
IMM GRANULOCYTES # BLD AUTO: <0.03 K/UL (ref 0–0.3)
IMM GRANULOCYTES NFR BLD: 0 %
LYMPHOCYTES NFR BLD: 1.84 K/UL (ref 1.1–3.7)
LYMPHOCYTES RELATIVE PERCENT: 19 % (ref 24–43)
MCH RBC QN AUTO: 27.1 PG (ref 25.2–33.5)
MCHC RBC AUTO-ENTMCNC: 31.6 G/DL (ref 28.4–34.8)
MCV RBC AUTO: 85.6 FL (ref 82.6–102.9)
MICROALBUMIN UR-MCNC: 13 MG/L
MICROALBUMIN/CREAT UR-RTO: 9 MCG/MG CREAT
MONOCYTES NFR BLD: 0.66 K/UL (ref 0.1–1.2)
MONOCYTES NFR BLD: 7 % (ref 3–12)
NEUTROPHILS NFR BLD: 71 % (ref 36–65)
NEUTS SEG NFR BLD: 7.01 K/UL (ref 1.5–8.1)
NRBC BLD-RTO: 0 PER 100 WBC
PLATELET # BLD AUTO: 319 K/UL (ref 138–453)
PMV BLD AUTO: 10.9 FL (ref 8.1–13.5)
POTASSIUM SERPL-SCNC: 4.6 MMOL/L (ref 3.7–5.3)
PROT SERPL-MCNC: 7.8 G/DL (ref 6.4–8.3)
RBC # BLD AUTO: 5.36 M/UL (ref 3.95–5.11)
SODIUM SERPL-SCNC: 137 MMOL/L (ref 135–144)
T PALLIDUM AB SER QL IA: NONREACTIVE
TSH SERPL DL<=0.05 MIU/L-ACNC: 1.58 UIU/ML (ref 0.3–5)
VIT B12 SERPL-MCNC: 630 PG/ML (ref 232–1245)
WBC OTHER # BLD: 9.8 K/UL (ref 3.5–11.3)

## 2023-10-24 PROCEDURE — 82570 ASSAY OF URINE CREATININE: CPT

## 2023-10-24 PROCEDURE — 86140 C-REACTIVE PROTEIN: CPT

## 2023-10-24 PROCEDURE — 80053 COMPREHEN METABOLIC PANEL: CPT

## 2023-10-24 PROCEDURE — 82746 ASSAY OF FOLIC ACID SERUM: CPT

## 2023-10-24 PROCEDURE — 82043 UR ALBUMIN QUANTITATIVE: CPT

## 2023-10-24 PROCEDURE — 84443 ASSAY THYROID STIM HORMONE: CPT

## 2023-10-24 PROCEDURE — 85652 RBC SED RATE AUTOMATED: CPT

## 2023-10-24 PROCEDURE — 86780 TREPONEMA PALLIDUM: CPT

## 2023-10-24 PROCEDURE — 87389 HIV-1 AG W/HIV-1&-2 AB AG IA: CPT

## 2023-10-24 PROCEDURE — 82525 ASSAY OF COPPER: CPT

## 2023-10-24 PROCEDURE — 36415 COLL VENOUS BLD VENIPUNCTURE: CPT

## 2023-10-24 PROCEDURE — 84207 ASSAY OF VITAMIN B-6: CPT

## 2023-10-24 PROCEDURE — 85025 COMPLETE CBC W/AUTO DIFF WBC: CPT

## 2023-10-24 PROCEDURE — 82607 VITAMIN B-12: CPT

## 2023-10-26 LAB — COPPER SERPL-MCNC: 126.1 UG/DL (ref 80–155)

## 2023-10-27 LAB — PYRIDOXAL PHOS SERPL-SCNC: 24.2 NMOL/L (ref 20–125)

## 2024-01-11 ENCOUNTER — HOSPITAL ENCOUNTER (OUTPATIENT)
Facility: CLINIC | Age: 54
Setting detail: THERAPIES SERIES
Discharge: HOME | End: 2024-01-11
Payer: COMMERCIAL

## 2024-01-11 PROCEDURE — 92610 EVALUATE SWALLOWING FUNCTION: CPT

## 2024-01-11 NOTE — PROGRESS NOTES
[] [facility]  Outpatient Rehabilitation &  Therapy  [address]  [phone]  F: [phone] [] [facility]  Outpatient Rehabilitation &  Therapy  [address]  P: [phone]  F: [phone] [] [facility]  Outpatient Rehabilitation &  Therapy  [address]   P: [phone]  F: [phone] [] [facility] Outpatient Rehabilitation &  Therapy  [address]  P: [phone]  F: [phone]        Speech-Language/Cognitive Evaluation     Date:  2024  Patient: Kat Akbar  : 1970  MRN: [id number]  Physician: ***     Insurance: ***  Medical Diagnosis: ***    Rehab Codes: ***  Onset date: ***    Next 's appt.: ***    Subjective/Patient Report:     PMH:    Comorbidities:   [] Obesity [] Dialysis  [] N/A   [] Asthma/COPD [] Dementia [] Other:   [] Stroke [] Sleep apnea [] Other:   [] Vascular disease [] Rheumatic disease [] Other:        Pain:  [] Yes  [] No   Location: *** N/A   Pain Rating: (0-10 scale) ***/10  Pain altered Tx:  [] No  [] Yes  Action:      Medications: [] Refer to full medical record [] None [] Other:  Allergies:      [] Refer to full medical record [] None [] Other:    Hearing [] WFL    [] Other:   Vision [] WFL [] Other:   Hand Dominance  [] Right [] Left   Dentition  [] WFL [] Partials:  [] Dentures:  [] Other:          Prior Level of Function:  Lives with:  Living situation:  Education:  Pt responsibilities: [] Medications [] Finances [] Driving [] Cooking  Employment: [] Retired [] Occupation:  Hobbies/Interests:          Tests Administered:  [] Larry Cognitive Assessment (MOCA)  [] [employer] Mental Status test (SLUMS)  [] [place] Diagnostic Aphasia Examination-3rd Edition (BDAE)  [][place] Naming Test )BNT) [] Other:      Objective:     Prior Level of Function Current Level of Function   Swallowing []WNL  [] Impaired []WNL  [] Impaired   Cognition []WNL 
Risks/Benefits discussed  [] Home exercise program     Method of Education: [x] Verbal  [] Demo  [] Written  Comprehension of Education:  [x] Verbalizes understanding.  [x] Demonstrates understanding.  [] Needs Review.  [] Demonstrates/verbalizes understanding of HEP/Ed previously given.      Treatment Plan:  [] Speech/Lang Therapy  59877 [] Dysphagia Therapy  34071   [] TherIvntj Cog Funcj Contact (1st 15 min)  36232 [] TherIvntj Cog Funcj Contact (addt'l 15 min)  47969         Frequency:  Recommendation for dysphagia therapy will be pending results of the MBSS.    LTG: n/a  STG: n/a      Treatment Charges: Mins Units   [] Eval Speech Sound Lang Comprehension  99023     [x]  Bedside Swallow Eval 60 1   []  Speech/Lang Therapy      []  Dysphagia Therapy     []  TherIvntj Cog Funcj Contact (1st 15 min)     []  TherIvntj Cog Funcj Contact (addt'l 15 min)     []  Other     Total Treatment time 60 1       Time in:1500   Time Out:1600    Thank you for this referral.        Electronically signed by: Lay Ross M.A. CCC-SLP      Physician Signature:________________________________Date:__________________  By signing above or cosigning this note, I have reviewed this plan of care and certify a need for medically necessary rehabilitation services.     *PLEASE SIGN ABOVE AND FAX BACK ALL PAGES*

## 2024-01-29 ENCOUNTER — HOSPITAL ENCOUNTER (OUTPATIENT)
Dept: GENERAL RADIOLOGY | Age: 54
Discharge: HOME | End: 2024-01-31
Payer: COMMERCIAL

## 2024-01-29 DIAGNOSIS — R13.12 OROPHARYNGEAL DYSPHAGIA: ICD-10-CM

## 2024-01-29 DIAGNOSIS — G35 MULTIPLE SCLEROSIS (HCC): ICD-10-CM

## 2024-01-29 PROCEDURE — 2500000003 HC RX 250 WO HCPCS: Performed by: GENERAL ACUTE CARE HOSPITAL

## 2024-01-29 PROCEDURE — 92611 MOTION FLUOROSCOPY/SWALLOW: CPT

## 2024-01-29 PROCEDURE — 74230 X-RAY XM SWLNG FUNCJ C+: CPT

## 2024-01-29 RX ADMIN — BARIUM SULFATE 1 TABLET: 700 TABLET ORAL at 10:12

## 2024-01-29 NOTE — PROCEDURES
INSTRUMENTAL SWALLOW REPORT  MODIFIED BARIUM SWALLOW    NAME: Kat Akbar   : 1970  MRN: [id number]       Date of Eval: 2024        Radiologist: Dr. Philip    Past Medical History:  has a past medical history of Arthritis, Bladder spasms, Depression, Diabetes mellitus (HCC), Fracture, GERD (gastroesophageal reflux disease), Hypertension, Kidney stones, Pain, Personal history of other medical treatment, Seasonal allergies, Snores, Under care of team, Urinary urgency, UTI (urinary tract infection), Wears glasses, and Wellness examination.  Past Surgical History:  has a past surgical history that includes Knee arthroscopy (Left, ); Shoulder arthroscopy (Right, ); Nasal septum surgery (); Hysterectomy (); Tonsillectomy; Knee arthroscopy (Left, 2020); Tooth Extraction (2020); Total knee arthroplasty (Left, 2020); Humerus fracture surgery (Left, 10/01/2021); Forearm surgery (Left, 10/1/2021); shoulder surgery (Left, 10/01/2021); Colonoscopy; LASIK (Bilateral, ); shoulder surgery (Left, 2022); shoulder surgery (Left, 2022); and Revision total knee arthroplasty (Left, 1/3/2023).    Type of Study: Initial MBS    Patient Complaints/Reason for Referral:  Kat Akbar was referred for a MBS to assess the efficiency of her swallow function, assess for aspiration, and to make recommendations regarding safe dietary consistencies, effective compensatory strategies, and safe eating environment.  Patient complaints: Pt. recently dx with MS.  C/o difficulty with pills and globus sensation.    Behavior/Cognition/Vision/Hearing:  Behavior/Cognition: Alert;Cooperative  Vision: Within Functional Limits  Hearing: Within functional limits    Impressions:  Pt. With no penetration, no aspiration with puree.  Min vallecula and pyriform residual noted.  No penetration, no aspiration with soft solid.  No penetration, no aspiration with regular solid.  Minimal BOT residual noted,

## 2024-03-11 DIAGNOSIS — Z96.652 HX OF TOTAL KNEE ARTHROPLASTY, LEFT: Primary | ICD-10-CM

## 2024-03-12 ENCOUNTER — HOSPITAL ENCOUNTER (OUTPATIENT)
Dept: VASCULAR LAB | Age: 54
Discharge: HOME | End: 2024-03-14
Payer: COMMERCIAL

## 2024-03-12 ENCOUNTER — OFFICE VISIT (OUTPATIENT)
Dept: ORTHOPEDIC SURGERY | Age: 54
End: 2024-03-12
Payer: COMMERCIAL

## 2024-03-12 ENCOUNTER — HOSPITAL ENCOUNTER (OUTPATIENT)
Age: 54
Discharge: HOME | End: 2024-03-14
Payer: COMMERCIAL

## 2024-03-12 ENCOUNTER — HOSPITAL ENCOUNTER (OUTPATIENT)
Dept: GENERAL RADIOLOGY | Age: 54
Discharge: HOME | End: 2024-03-14
Payer: COMMERCIAL

## 2024-03-12 VITALS — OXYGEN SATURATION: 100 % | RESPIRATION RATE: 15 BRPM | BODY MASS INDEX: 40.32 KG/M2 | WEIGHT: 242 LBS | HEIGHT: 65 IN

## 2024-03-12 DIAGNOSIS — M54.40 BILATERAL LOW BACK PAIN WITH SCIATICA, SCIATICA LATERALITY UNSPECIFIED, UNSPECIFIED CHRONICITY: ICD-10-CM

## 2024-03-12 DIAGNOSIS — G35 MULTIPLE SCLEROSIS (HCC): ICD-10-CM

## 2024-03-12 DIAGNOSIS — M79.2 NERVE PAIN: ICD-10-CM

## 2024-03-12 DIAGNOSIS — M79.2 NERVE PAIN: Primary | ICD-10-CM

## 2024-03-12 DIAGNOSIS — M79.662 PAIN OF LEFT CALF: ICD-10-CM

## 2024-03-12 LAB — ECHO BSA: 2.24 M2

## 2024-03-12 PROCEDURE — 72110 X-RAY EXAM L-2 SPINE 4/>VWS: CPT

## 2024-03-12 PROCEDURE — 1036F TOBACCO NON-USER: CPT | Performed by: PHYSICIAN ASSISTANT

## 2024-03-12 PROCEDURE — 99214 OFFICE O/P EST MOD 30 MIN: CPT | Performed by: PHYSICIAN ASSISTANT

## 2024-03-12 PROCEDURE — G8484 FLU IMMUNIZE NO ADMIN: HCPCS | Performed by: PHYSICIAN ASSISTANT

## 2024-03-12 PROCEDURE — 93971 EXTREMITY STUDY: CPT

## 2024-03-12 PROCEDURE — 3017F COLORECTAL CA SCREEN DOC REV: CPT | Performed by: PHYSICIAN ASSISTANT

## 2024-03-12 PROCEDURE — G8427 DOCREV CUR MEDS BY ELIG CLIN: HCPCS | Performed by: PHYSICIAN ASSISTANT

## 2024-03-12 PROCEDURE — G8417 CALC BMI ABV UP PARAM F/U: HCPCS | Performed by: PHYSICIAN ASSISTANT

## 2024-03-12 RX ORDER — METFORMIN HYDROCHLORIDE EXTENDED-RELEASE TABLETS 500 MG/1
500 TABLET, FILM COATED, EXTENDED RELEASE ORAL
COMMUNITY

## 2024-03-12 RX ORDER — CYCLOBENZAPRINE HCL 10 MG
TABLET ORAL
COMMUNITY
Start: 2024-01-03

## 2024-03-12 RX ORDER — BLOOD-GLUCOSE SENSOR
EACH MISCELLANEOUS
COMMUNITY
Start: 2024-01-12

## 2024-03-12 RX ORDER — LORAZEPAM 1 MG/1
TABLET ORAL
COMMUNITY
Start: 2024-03-04

## 2024-03-12 RX ORDER — INSULIN LISPRO 100 [IU]/ML
INJECTION, SOLUTION INTRAVENOUS; SUBCUTANEOUS
COMMUNITY
Start: 2024-01-05

## 2024-03-12 RX ORDER — TIZANIDINE 4 MG/1
4 TABLET ORAL 3 TIMES DAILY
Qty: 30 TABLET | Refills: 0 | Status: SHIPPED | OUTPATIENT
Start: 2024-03-12 | End: 2024-03-22

## 2024-03-12 RX ORDER — INSULIN ASPART 100 [IU]/ML
INJECTION, SOLUTION INTRAVENOUS; SUBCUTANEOUS
COMMUNITY

## 2024-03-12 RX ORDER — GABAPENTIN 800 MG/1
900 TABLET ORAL 3 TIMES DAILY
COMMUNITY
Start: 2024-02-25

## 2024-03-12 RX ORDER — SUMATRIPTAN SUCCINATE 25 MG/1
TABLET, FILM COATED ORAL
COMMUNITY
Start: 2023-11-16

## 2024-03-12 RX ORDER — NICOTINE POLACRILEX 2 MG
1 GUM BUCCAL DAILY
COMMUNITY

## 2024-03-12 RX ORDER — TROSPIUM CHLORIDE ER 60 MG/1
60 CAPSULE ORAL
COMMUNITY
Start: 2024-03-11

## 2024-03-12 RX ORDER — DULOXETINE 40 MG/1
CAPSULE, DELAYED RELEASE ORAL
COMMUNITY
Start: 2024-02-11

## 2024-03-12 RX ORDER — BACLOFEN 10 MG/1
TABLET ORAL
COMMUNITY
Start: 2024-01-05

## 2024-03-12 ASSESSMENT — ENCOUNTER SYMPTOMS
SHORTNESS OF BREATH: 0
VOMITING: 0
COLOR CHANGE: 0
APNEA: 0
ABDOMINAL PAIN: 0
GASTROINTESTINAL NEGATIVE: 1
CHEST TIGHTNESS: 0
ABDOMINAL DISTENTION: 0
NAUSEA: 0
DIARRHEA: 0
CONSTIPATION: 0
COUGH: 0
RESPIRATORY NEGATIVE: 1

## 2024-03-12 NOTE — PROGRESS NOTES
SPINE W WO CONTRAST     Standing Status:   Future     Standing Expiration Date:   3/12/2025     Order Specific Question:   STAT Creatinine as needed:     Answer:   Yes     Order Specific Question:   Reason for exam:     Answer:   hx of Multiple sclerosis with lesion seen on thoracic and cervical MRI     Order Specific Question:   What is the sedation requirement?     Answer:   None    XR LUMBAR SPINE (MIN 4 VIEWS)     Standing Status:   Future     Number of Occurrences:   1     Standing Expiration Date:   3/12/2025     Order Specific Question:   Reason for exam:     Answer:   hx of Multiple sclerosis    Vascular duplex lower extremity venous left     Standing Status:   Future     Number of Occurrences:   1     Standing Expiration Date:   3/12/2025       This note is created with the assistance of a speech recognition program.  While intending to generate a document that actually reflects the content of the visit, the document can still have some errors including those of syntax and sound a like substitutions which may escape proof reading.  In such instances, actual meaning can be extrapolated by contextual diversion.     Electronically signed by Rosibel Schwarz PA-C on 3/12/2024 at 4:30 PM     Yes

## 2024-03-14 ENCOUNTER — TRANSCRIBE ORDERS (OUTPATIENT)
Dept: ADMINISTRATIVE | Age: 54
End: 2024-03-14

## 2024-03-14 DIAGNOSIS — G35 MULTIPLE SCLEROSIS (HCC): Primary | ICD-10-CM

## 2024-03-26 ENCOUNTER — HOSPITAL ENCOUNTER (OUTPATIENT)
Dept: MRI IMAGING | Age: 54
Discharge: HOME | End: 2024-03-28
Payer: COMMERCIAL

## 2024-03-26 DIAGNOSIS — M79.2 NERVE PAIN: ICD-10-CM

## 2024-03-26 DIAGNOSIS — G35 MULTIPLE SCLEROSIS (HCC): ICD-10-CM

## 2024-03-26 DIAGNOSIS — M54.40 BILATERAL LOW BACK PAIN WITH SCIATICA, SCIATICA LATERALITY UNSPECIFIED, UNSPECIFIED CHRONICITY: ICD-10-CM

## 2024-03-26 LAB — CREAT BLD-MCNC: 0.5 MG/DL (ref 0.6–1.4)

## 2024-03-26 PROCEDURE — 72158 MRI LUMBAR SPINE W/O & W/DYE: CPT

## 2024-03-26 PROCEDURE — A9579 GAD-BASE MR CONTRAST NOS,1ML: HCPCS

## 2024-03-26 PROCEDURE — 72156 MRI NECK SPINE W/O & W/DYE: CPT

## 2024-03-26 PROCEDURE — 82565 ASSAY OF CREATININE: CPT

## 2024-03-26 PROCEDURE — 6360000004 HC RX CONTRAST MEDICATION

## 2024-03-26 PROCEDURE — 2580000003 HC RX 258

## 2024-03-26 RX ORDER — SODIUM CHLORIDE 0.9 % (FLUSH) 0.9 %
10 SYRINGE (ML) INJECTION PRN
Status: DISCONTINUED | OUTPATIENT
Start: 2024-03-26 | End: 2024-03-29 | Stop reason: HOSPADM

## 2024-03-26 RX ADMIN — GADOTERIDOL 20 ML: 279.3 INJECTION, SOLUTION INTRAVENOUS at 13:53

## 2024-03-26 RX ADMIN — SODIUM CHLORIDE, PRESERVATIVE FREE 10 ML: 5 INJECTION INTRAVENOUS at 13:52

## 2024-03-28 ENCOUNTER — HOSPITAL ENCOUNTER (OUTPATIENT)
Dept: MRI IMAGING | Age: 54
Discharge: HOME | End: 2024-03-30
Payer: COMMERCIAL

## 2024-03-28 ENCOUNTER — TELEPHONE (OUTPATIENT)
Dept: ORTHOPEDIC SURGERY | Age: 54
End: 2024-03-28

## 2024-03-28 DIAGNOSIS — G35 MULTIPLE SCLEROSIS (HCC): ICD-10-CM

## 2024-03-28 PROCEDURE — 70553 MRI BRAIN STEM W/O & W/DYE: CPT

## 2024-03-28 PROCEDURE — 6360000004 HC RX CONTRAST MEDICATION

## 2024-03-28 PROCEDURE — A9579 GAD-BASE MR CONTRAST NOS,1ML: HCPCS

## 2024-03-28 PROCEDURE — 2580000003 HC RX 258

## 2024-03-28 PROCEDURE — 72157 MRI CHEST SPINE W/O & W/DYE: CPT

## 2024-03-28 RX ORDER — SODIUM CHLORIDE 0.9 % (FLUSH) 0.9 %
10 SYRINGE (ML) INJECTION ONCE
Status: COMPLETED | OUTPATIENT
Start: 2024-03-28 | End: 2024-03-28

## 2024-03-28 RX ADMIN — SODIUM CHLORIDE, PRESERVATIVE FREE 10 ML: 5 INJECTION INTRAVENOUS at 09:01

## 2024-03-28 RX ADMIN — GADOTERIDOL 20 ML: 279.3 INJECTION, SOLUTION INTRAVENOUS at 09:01

## 2024-03-28 NOTE — TELEPHONE ENCOUNTER
I called and spoke to Chapis regarding her MRI results of her lumbar spine.  Her MRI was essentially negative but just had some mild degenerative changes.  She did not have any lesions on her spine like she did on her thoracic and cervical spine.  At this time, there is not more for me to offer her except for some physical therapy.  She did discuss this with her MS doctor at the [facility] and they had mentioned something about doing an EMG show at this point number and leave this leg pain that she is having to her MS doctor.  She will let me know if there is anything that I can do to help.

## 2024-04-02 ENCOUNTER — HOSPITAL ENCOUNTER (OUTPATIENT)
Facility: CLINIC | Age: 54
Setting detail: THERAPIES SERIES
Discharge: HOME | End: 2024-04-02
Payer: COMMERCIAL

## 2024-04-02 PROCEDURE — 92523 SPEECH SOUND LANG COMPREHEN: CPT

## 2024-04-02 NOTE — CONSULTS
(1st 15 min)     []  TherIvntj Cog Funcj Contact (addt'l 15 min)     []  Other     Total Billable time 60 1         Time in: 1410   Time Out:1510    Thank you for this referral.      Electronically signed by: MERARY Watson          Physician Signature:________________________________Date:__________________  By signing above or cosigning this note, I have reviewed this plan of care and certify a need for medically necessary rehabilitation services.     *PLEASE SIGN ABOVE AND FAX BACK ALL PAGES*

## 2024-04-09 ENCOUNTER — HOSPITAL ENCOUNTER (OUTPATIENT)
Facility: CLINIC | Age: 54
Setting detail: THERAPIES SERIES
Discharge: HOME | End: 2024-04-09
Payer: COMMERCIAL

## 2024-04-09 NOTE — FLOWSHEET NOTE
[] [facility]  Outpatient Rehabilitation &  Therapy  [address]  [phone]  [phone] [x] [facility]  Outpatient Rehabilitation &  Therapy  [address]  P: [phone]  F: [phone] [] [facility]  Outpatient Rehabilitation &  Therapy  [address]  P: [phone]  F: [phone] [] [facility]  Outpatient Rehabilitation &  Therapy  [address]  [phone]  [phone] [] [facility]  Outpatient Rehabilitation &  Therapy  [address]   P: [phone]  F: [phone]  [] [facility]  Outpatient Rehabilitation &  Therapy  [address]  P: [phone]  F: [phone] [] [facility]  Outpatient Rehabilitation &  Therapy  [address]  P: [phone]  F: [phone] [] [facility]  Outpatient Rehabilitation &  Therapy  [address]  P: [phone]  F: [phone] [] [facility]  Outpatient Rehabilitation &  Therapy  [address]  P: [phone]  F: [phone]  [] [facility] Outpatient Rehabilitation &  Therapy  [address]  P: [phone]  F: [phone]     Therapy Cancel/No Show note    Date: 2024  Patient: Kat Akbar  : 1970  MRN: [id number]    Cancels/No Shows to date: 10    For today's appointment patient:    [x]  Cancelled    [] Rescheduled appointment    [] No-show     Reason given by patient:    []  Patient ill    []  Conflicting appointment    [] No transportation      [] Conflict with work    [] No reason given    [] Weather related    [] COVID-19    [x] Other:      Comments: Pt called on 2024 and left a message asking to cancel today's appointment, per the appointment note left by  staff. ST called pt to reschedule,  but pt reported that she has \"too

## 2024-04-30 DIAGNOSIS — Z96.652 STATUS POST REVISION OF TOTAL REPLACEMENT OF LEFT KNEE: Primary | ICD-10-CM

## 2024-04-30 DIAGNOSIS — M17.11 PRIMARY OSTEOARTHRITIS OF RIGHT KNEE: ICD-10-CM

## 2024-05-01 ENCOUNTER — OFFICE VISIT (OUTPATIENT)
Dept: ORTHOPEDIC SURGERY | Age: 54
End: 2024-05-01
Payer: COMMERCIAL

## 2024-05-01 VITALS — BODY MASS INDEX: 39.44 KG/M2 | RESPIRATION RATE: 18 BRPM | WEIGHT: 237 LBS

## 2024-05-01 DIAGNOSIS — Z96.652 HX OF TOTAL KNEE ARTHROPLASTY, LEFT: Primary | ICD-10-CM

## 2024-05-01 DIAGNOSIS — M17.11 ARTHRITIS OF RIGHT KNEE: ICD-10-CM

## 2024-05-01 PROCEDURE — 99214 OFFICE O/P EST MOD 30 MIN: CPT | Performed by: ORTHOPAEDIC SURGERY

## 2024-05-01 PROCEDURE — G8417 CALC BMI ABV UP PARAM F/U: HCPCS | Performed by: ORTHOPAEDIC SURGERY

## 2024-05-01 PROCEDURE — 3017F COLORECTAL CA SCREEN DOC REV: CPT | Performed by: ORTHOPAEDIC SURGERY

## 2024-05-01 PROCEDURE — 1036F TOBACCO NON-USER: CPT | Performed by: ORTHOPAEDIC SURGERY

## 2024-05-01 PROCEDURE — G8427 DOCREV CUR MEDS BY ELIG CLIN: HCPCS | Performed by: ORTHOPAEDIC SURGERY

## 2024-05-01 RX ORDER — PREGABALIN 25 MG/1
CAPSULE ORAL
COMMUNITY
Start: 2024-03-15

## 2024-05-01 RX ORDER — PREGABALIN 50 MG/1
50 CAPSULE ORAL 3 TIMES DAILY
COMMUNITY
Start: 2024-04-15 | End: 2024-06-14

## 2024-05-02 ASSESSMENT — ENCOUNTER SYMPTOMS
SHORTNESS OF BREATH: 0
EYE DISCHARGE: 0
ABDOMINAL PAIN: 0
ROS SKIN COMMENTS: NEGATIVE FOR RASH

## 2024-05-02 NOTE — PROGRESS NOTES
[facility] ORTHOPEDICS AND SPORTS MEDICINE  [address]  [place] [address]  Dept: [phone]  Dept Fax: [phone]        Ambulatory Follow Up      Subjective:   Kat Akbar is a 53 y.o. year old female who presents to our office today for routine followup regarding her   1. Hx of total knee arthroplasty, left    2. Arthritis of right knee    .    Chief Complaint   Patient presents with    Follow-up     B Knee Pain s/p L TKA dos 1/3/23, PCXR       HPI  Kat Akbar is a 53-year-old female who presents the office today for recheck.  She underwent left total knee arthroplasty on 1/3/2023.  She is happy with the result.  She is still having some right knee pain.  She has applied for disability after being recently diagnosed with multiple sclerosis.  She has been using a walker and/or cane now for the last 6 months because of her MS.    Review of Systems   Constitutional:  Positive for activity change. Negative for fever.   HENT:  Negative for dental problem.    Eyes:  Negative for discharge.   Respiratory:  Negative for shortness of breath.    Cardiovascular:  Negative for chest pain.   Gastrointestinal:  Negative for abdominal pain.   Genitourinary: Negative.    Musculoskeletal:  Positive for arthralgias.   Skin:         Negative for rash   Neurological:  Positive for weakness.   Psychiatric/Behavioral:  Negative for confusion.        I have reviewed the CC, HPI, ROS, PMH, FHX, Social History, and if not present in this note, I have reviewed in the patient's chart.   I agree with the documentation provided by other staff and have reviewed their documentation prior to providing my signature indicating agreement.    Objective :   Resp 18   Wt 107.5 kg (237 lb)   BMI 39.44 kg/m²  Body mass index is 39.44 kg/m².  General: Kat Akbar is a 53 y.o. female who is alert and oriented and sitting comfortably in our

## 2024-05-14 ENCOUNTER — HOSPITAL ENCOUNTER (OUTPATIENT)
Dept: PHYSICAL THERAPY | Facility: CLINIC | Age: 54
Setting detail: THERAPIES SERIES
Discharge: HOME | End: 2024-05-14
Payer: COMMERCIAL

## 2024-05-14 PROCEDURE — 97162 PT EVAL MOD COMPLEX 30 MIN: CPT

## 2024-05-14 PROCEDURE — 97530 THERAPEUTIC ACTIVITIES: CPT

## 2024-05-14 NOTE — CONSULTS
[] [facility]  Outpatient Rehabilitation &  Therapy  [address]  [phone]  F: [phone] [] [facility]  Outpatient Rehabilitation &  Therapy  [address]   [address]  P: [phone]  F: [phone] [] [facility]  Outpatient Rehabilitation &  Therapy  [address]  P: [phone]  F: [phone] [] [facility]  Outpatient Rehabilitation &  Therapy  [address]  P: [phone]  F: [phone] [x] [facility]  Outpatient Rehabilitation &  Therapy  [address]   P: [phone]  F: [phone]      Physical Therapy Lower Extremity Evaluation    Date:  2024  Patient: Kat Akbar   : 1970  MRN: [id number]  Physician: Dr. Karli Escobar  Insurance: [facility] ( vs)  Medical Diagnosis: Multiple Sclerosis (HCC) G35)     Rehab Codes: M62.81 , R26.89 , M54.2, R29.3, R20.2   Onset date: referral date 24  Next 's appt.: 2024     Subjective:   CC/HPI: Patient is a 52 y/o female presenting with main complaints of balance issues and instability since her diagnosis of MS (Relapsing Remitting) in 2023. She reports that it is getting gradually worse. She reports a fall last week while stepping into her house in which she caught her shoe on the step. She sustained multiple bruises, but denies other injuries. She notified her neurologist who recommended outpatient PT.   Since her diagnosis in 2023, She was admitted for 2, 1 week spans. She had PT and OT weekly with home health after she was discharged from the hospital for a few months. Was using a walker, then started using a cane a few weeks ago. She has been walking without an AD around her home as of lately.   She is getting Rituximab infusions, which she began 2024. She has hew next infusion in July.       PMHx: [] Unremarkable [x] Diabetes [x] HTN  [] Pacemaker   [] MI/Heart Problems []

## 2024-05-20 ENCOUNTER — HOSPITAL ENCOUNTER (OUTPATIENT)
Dept: PHYSICAL THERAPY | Facility: CLINIC | Age: 54
Setting detail: THERAPIES SERIES
Discharge: HOME | End: 2024-05-20
Payer: COMMERCIAL

## 2024-05-20 PROCEDURE — 97110 THERAPEUTIC EXERCISES: CPT

## 2024-05-20 NOTE — FLOWSHEET NOTE
[x] [facility]  Outpatient Rehabilitation &  Therapy  [address]   P: [phone]  F: [phone]      Physical Therapy Daily Treatment Note    Date:  2024  Patient Name:  Kat Akbar    :  1970  MRN: [id number]  Physician: Dr. Karli Escobar                       Insurance: [facility] ( vs)  Medical Diagnosis: Multiple Sclerosis (HCC) G35)                           Rehab Codes: M62.81 , R26.89 , M54.2, R29.3, R20.2   Onset date: referral date 24                   Next 's appt.: 2024     Visit# / total visits:      Cancels/No Shows:     Subjective:    Pain:  [] Yes  [x] No Location:  Neck Pain Rating: (0-10 scale) 0/10  Pain altered Tx:  [x] No  [] Yes  Action:  Comments: Patient arrived noting decreased pain in neck, notes more soreness in clementine UE this date. Notes no new issues of falls or LOB.    Objective:  Modalities:   Precautions: fall risk   Exercise    Reps/ Time Weight/ Level Comments             NuStep   10'                  Upper trap stretch x30\"       Levator scapulae stretch  x30\"       Posterior shoulder rolls  x5                 Calf Stretch   3x30\"    Parallel Bars   Hamstring stretch  3x30\"   Parallel Bars         SLR   2x10       Clamshells          Bridges   2x10       Sidelying hip abduction                    4-way hip Tband   x10  [place]ange  Parallel Bars   Tband TKE          Total Gym Squats   2x10  L20               Other:       Specific Instructions for next treatment: provide patient with a LE strengthening program for her HEP, continue with exercises that appropriately challenge her balance      Treatment Charges: Mins Units   []  Modalities     [x]  Ther Exercise 30 2   []  Manual Therapy     []  Ther Activities     []  Neuro Re-ed     []  Vasocompression     [] Gait     []  Other     Total Billable time 30 2       Assessment: [x] Progressing toward goals. Progressed strength program this date. Patient notes no pain with strength

## 2024-05-29 ENCOUNTER — HOSPITAL ENCOUNTER (OUTPATIENT)
Dept: PHYSICAL THERAPY | Facility: CLINIC | Age: 54
Setting detail: THERAPIES SERIES
Discharge: HOME | End: 2024-05-29
Payer: COMMERCIAL

## 2024-05-29 NOTE — FLOWSHEET NOTE
[] [facility]  Outpatient Rehabilitation &  Therapy  [address]  [phone]  [phone] [] [facility]  Outpatient Rehabilitation &  Therapy  [address]  P: [phone]  F: [phone] [] [facility]  Outpatient Rehabilitation &  Therapy  [address]  P: [phone]  F: [phone] [] [facility]  Outpatient Rehabilitation &  Therapy  [address]  [phone]  [phone] [x] [facility]  Outpatient Rehabilitation &  Therapy  [address]   P: [phone]  F: [phone]  [] [facility]  Outpatient Rehabilitation &  Therapy  [address]  P: [phone]  F: [phone] [] [facility]  Outpatient Rehabilitation &  Therapy  [address]  P: [phone]  F: [phone] [] [facility]  Outpatient Rehabilitation &  Therapy  [address]  P: [phone]  F: [phone] [] [facility]  Outpatient Rehabilitation &  Therapy  [address]  P: [phone]  F: [phone]  [] [facility] Outpatient Rehabilitation &  Therapy  [address]  P: [phone]  F: [phone]     Therapy Cancel/No Show note    Date: 2024  Patient: Kat Akbar  : 1970  MRN: [id number]    Cancels/No Shows to date:     For today's appointment patient:    [x]  Cancelled    [] Rescheduled appointment    [] No-show     Reason given by patient:    []  Patient ill    []  Conflicting appointment    [] No transportation      [x] Conflict with work    [] No reason given    [] Weather related    [] COVID-19    [] Other:      Comments:        [x] Next appointment was confirmed    Electronically signed by: JONELLE SILVA, PTA

## 2024-06-03 ENCOUNTER — HOSPITAL ENCOUNTER (OUTPATIENT)
Dept: PHYSICAL THERAPY | Facility: CLINIC | Age: 54
Setting detail: THERAPIES SERIES
End: 2024-06-03
Payer: COMMERCIAL

## 2024-06-05 ENCOUNTER — HOSPITAL ENCOUNTER (OUTPATIENT)
Dept: PHYSICAL THERAPY | Facility: CLINIC | Age: 54
Setting detail: THERAPIES SERIES
Discharge: HOME | End: 2024-06-05
Payer: COMMERCIAL

## 2024-06-05 PROCEDURE — 97110 THERAPEUTIC EXERCISES: CPT

## 2024-06-05 NOTE — FLOWSHEET NOTE
[x] [facility]  Outpatient Rehabilitation &  Therapy  [address]   P: [phone]  F: [phone]      Physical Therapy Daily Treatment Note    Date:  2024  Patient Name:  Kat Akbar    :  1970  MRN: [id number]  Physician: Dr. Karli Escobar                       Insurance: [facility] ( vs)  Medical Diagnosis: Multiple Sclerosis (HCC) G35)                           Rehab Codes: M62.81 , R26.89 , M54.2, R29.3, R20.2   Onset date: referral date 24                   Next 's appt.: 2024   Visit# / total visits: 3/12     Cancels/No Shows: 1/0    Subjective:    Pain:  [] Yes  [x] No Location:  Neck Pain Rating: (0-10 scale) 0/10  Pain altered Tx:  [x] No  [] Yes  Action:  Comments: Patient arrives without complaints. She has been unpacking due to a recent move into a 1 story home. She reports a fall going up the steps in her old home. She denies any sustained injuries. Reports an improvement in her neck pain since doing the exercises.     Objective:  Modalities:   Precautions: fall risk   Exercise    Reps/ Time Weight/ Level Comments             NuStep   10'  L1               Upper trap stretch x30\"       Levator scapulae stretch  x30\"       Posterior shoulder rolls  x10                 Calf Stretch   3x30\"    Parallel Bars   Hamstring stretch  3x30\"   Parallel Bars         SLR   2x10       Clamshells   x10   Added 6/5    Bridges   2x10       Sidelying hip abduction   x10   Added 6/5             4-way hip Tband  held today  [place]  Parallel Bars   Tband TKE   10x10\"  Lime  Parallel bars    Mini-Lunges  x10    Parallel bars    Total Gym Squats   2x10  L20              Other:       Specific Instructions for next treatment: continue with exercises that appropriately challenge her balance      Treatment Charges: Mins Units   []  Modalities     [x]  Ther Exercise 42 3   []  Manual Therapy     []  Ther Activities     []  Neuro Re-ed     []  Vasocompression     [] Gait

## 2024-06-10 ENCOUNTER — APPOINTMENT (OUTPATIENT)
Dept: PHYSICAL THERAPY | Facility: CLINIC | Age: 54
End: 2024-06-10
Payer: COMMERCIAL

## 2024-06-12 ENCOUNTER — HOSPITAL ENCOUNTER (OUTPATIENT)
Dept: PHYSICAL THERAPY | Facility: CLINIC | Age: 54
Setting detail: THERAPIES SERIES
Discharge: HOME | End: 2024-06-12
Payer: COMMERCIAL

## 2024-06-12 PROCEDURE — 97110 THERAPEUTIC EXERCISES: CPT

## 2024-06-12 PROCEDURE — 97140 MANUAL THERAPY 1/> REGIONS: CPT

## 2024-06-12 NOTE — FLOWSHEET NOTE
[x] [facility]  Outpatient Rehabilitation &  Therapy  [address]   P: [phone]  F: [phone]      Physical Therapy Daily Treatment Notes    Date:  2024  Patient Name:  Kat Akbar    :  1970  MRN: [id number]  Physician: Dr. Karli Escobar                       Insurance: [facility] ( vs)  Medical Diagnosis: Multiple Sclerosis (HCC) G35)                           Rehab Codes: M62.81 , R26.89 , M54.2, R29.3, R20.2   Onset date: referral date 24                   Next 's appt.: 2024   Visit# / total visits:      Cancels/No Shows: 1/0    Subjective:    Pain:  [] Yes  [x] No Location:  Neck Pain Rating: (0-10 scale) 0/10  Pain altered Tx:  [x] No  [] Yes  Action:  Comments: She reports that her left leg continues to have some weakness, giving her a feeling of giving out. She has been using her cane while she is walking her dog.     Objective:  Modalities:   Precautions: fall risk  Exercise    Reps/ Time Weight/ Level Comments             NuStep   held  L1 Occupied today             Upper trap stretch x30\"       Levator scapulae stretch  x30\"       Posterior shoulder rolls  x10                 Calf Stretch   3x30\"    Parallel Bars   Hamstring stretch   3x30\"   Parallel Bars         SLR   2x10       Clamshells   x30 [place]  Added 6/5    Bridges   2x10       Sidelying hip abduction   x20 [place]  Added 6/5             2-way hip Tband   X10 ea  [place]   Parallel Bars  Hip extension and abduction tpday   Tband TKE   10x10\"  Lime  Parallel bars    Mini-Lunges  x10    Parallel bars    Total Gym Squats   2x10 L20     Total Gym Heel Raises   2x10 L20     Other:      Myofascial Restrictions  Location  R/L Treatment  Tools Notes   Lower Crossed    [x] Psoas   [x] Iliacus [] Right  [x] Left [x] Direct  [] Indirect [x] Hands-On  [] IASTM  [] Hypervolt     [x] Piriformis [] Right  [x] Left [x] Direct  [] Indirect [x] Hands-On  [] IASTM  [] Hypervolt               Direct

## 2024-06-17 ENCOUNTER — HOSPITAL ENCOUNTER (OUTPATIENT)
Dept: PHYSICAL THERAPY | Facility: CLINIC | Age: 54
Setting detail: THERAPIES SERIES
Discharge: HOME | End: 2024-06-17
Payer: COMMERCIAL

## 2024-06-17 PROCEDURE — 97110 THERAPEUTIC EXERCISES: CPT

## 2024-06-17 PROCEDURE — 97140 MANUAL THERAPY 1/> REGIONS: CPT

## 2024-06-17 NOTE — PROGRESS NOTES
[x] [facility]  Outpatient Rehabilitation &  Therapy  [address]   P: [phone]  F: [phone]      Physical Therapy Daily Treatment Notes    Date:  2024  Patient Name:  Kat Akbar    :  1970  MRN: [id number]  Physician: Dr. Karli Escobar                       Insurance: [facility] ( vs)  Medical Diagnosis: Multiple Sclerosis (HCC) G35)                           Rehab Codes: M62.81 , R26.89 , M54.2, R29.3, R20.2   Onset date: referral date 24                   Next 's appt.: 2024   Visit# / total visits:      Cancels/No Shows: 1/0    Subjective:    Pain:  [] Yes  [x] No Location:  Neck Pain Rating: (0-10 scale) 0/10  Pain altered Tx:  [x] No  [] Yes  Action:  Comments: Patient arrives with a cane this date. She reports that pain has been unchanged overall. She has been having to take her cane when she walks her dog.     Objective:  Modalities:   Precautions: fall risk  Exercise    Reps/ Time Weight/ Level Comments             NuStep   held  L1 Occupied today             Upper trap stretch        Levator scapulae stretch         Posterior shoulder rolls  x10       Pulleys   2'    flexion    Wall slides  x10     Pectoralis Stretch in doorway 3x30\"  Bilat    Rows Tband  x15 Lime     Shld Extension Tband  x10 Lime          Calf Stretch   3x30\"    Parallel Bars   Hamstring stretch   3x30\"   Parallel Bars   Hip flexor stretch  1'      Quadriceps stretch supine  3x30\"     SLR         Clamshells   Gove  Added 6/5    Bridges         Sidelying hip abduction   [place]  Added 6/5             2-way hip Tband    [place]  Parallel Bars  Hip extension and abduction today   Tband TKE    Lime  Parallel bars    Mini-Lunges     Parallel bars    Total Gym Squats   2x10 L20     Total Gym Heel Raises   2x10 L20     Other:      Myofascial Restrictions  Location  R/L Treatment  Tools Notes   Lower Crossed    [] Psoas   [] Iliacus [] Right  [] Left [] Direct  [] Indirect []

## 2024-06-19 ENCOUNTER — APPOINTMENT (OUTPATIENT)
Dept: PHYSICAL THERAPY | Facility: CLINIC | Age: 54
End: 2024-06-19
Payer: COMMERCIAL

## 2024-07-01 ENCOUNTER — HOSPITAL ENCOUNTER (OUTPATIENT)
Age: 54
Setting detail: THERAPIES SERIES
Discharge: HOME | End: 2024-07-01
Payer: COMMERCIAL

## 2024-07-01 PROCEDURE — 97113 AQUATIC THERAPY/EXERCISES: CPT

## 2024-07-01 NOTE — PROGRESS NOTES
despite inc in pain. Will continue to modify to keep pain at a low, consistent level.   [x] Patient would continue to benefit from skilled physical therapy services in order to: improve standing/walking tolerance, improve general strengthening, improve balance, and decrease her risk of falling to ease ADL's, decrease burden of care, and improve her overall quality of life.       Goals  MET NOT MET ON-  GOING  Details   Date Addressed:            STG: To be met in 6 treatments            1. ? Pain: Decrease pain levels to 3/10 with ADLs []  [x]  []   Pain continues to fluctuate 2/10 - 7/10   2. ? ROM: Increase flexibility and AROM limitations throughout to equal bilat to reduce difficulty with ADLs []  []  []      3. ? Strength: Patient to demonstrate a set of 10 SLR without quadriceps lag to ease functional limitations and mobility  []  []  []     4. Patient to demonstrate tandem stance for at least 30 seconds suggesting improve stability required for ADL's  []  []  []      5.Independent with Home Exercise Programs [x]  []  []      6.  Demonstrate knowledge of fall risk prevention  []  []  []      Date Addressed:            LTG: To be met in 12 treatments           1. Improve score on assessment tool Lower Extremity Functional Scale (LEFS) from 54% impairment to less than 40% impairment  []  []  []      2. Reduce pain levels to 0/10 or less with ADLs []  []  []      3.  Patient to demonstrate the TUG test in less than 11 seconds suggesting decreased fall risk  []  []  []      4. ? Strength:  Patient to improve her score on the 5xSTS to <20 seconds suggesting decreased fall risk []  []  []         Patient goals: better balance      Pt. Education:  [x] Yes  [] No  [] Reviewed Prior HEP/Ed  Method of Education: [x] Verbal  [x] Demo  [] Written - Education over self management with PT and self pacing techniques with MS.     Access Code: [email or web address]  URL: [email or web address]  Date: 06/17/2024  Prepared by: Janina

## 2024-07-09 ENCOUNTER — HOSPITAL ENCOUNTER (OUTPATIENT)
Age: 54
Setting detail: THERAPIES SERIES
Discharge: HOME | End: 2024-07-09
Payer: COMMERCIAL

## 2024-07-09 PROCEDURE — 97113 AQUATIC THERAPY/EXERCISES: CPT

## 2024-07-09 NOTE — PROGRESS NOTES
[][facility]  [address]  [place] [address]  [phone]  F: [phone] [x] [facility]  Outpatient Rehabilitation &  Therapy  [address]   P: [phone]  F: [phone]     Date:  2024  Patient Name:  Kat Akbar    :  1970  MRN: [id number]  Physician: Dr. Karli Escobar                       Insurance: [facility] ( vs)  Medical Diagnosis: Multiple Sclerosis (HCC) G35)                           Rehab Codes: M62.81 , R26.89 , M54.2, R29.3, R20.2   Onset date: referral date 24                   Next Dr's appt.: 2024   Visit# / total visits:      Cancels/No Shows: 1/0    Subjective:      Pain:  [] Yes  [x] No Location: posterior left knee and bilateral c-spine and UT    Pain Rating: (0-10 scale) 3/10 knee, 5/10 c-spine and upper traps.  Pain altered Tx:  [x] No  [] Yes  Action:    Comments: Pt reports she was tired after her last PT session but states she feels that she tolerated her initial aquatic session well.  This past weekend she traveled out of town on a plane to attend a baby shower.  C/O bilateral c-spine and upper trap soreness 4/10.     Objective:    Modalities:   Precautions: fall risk    Shallow water over edge   Date     24      Visit #        6  7      Walk F/L/R   4 laps F/R 2 laps F/L/R      Marching       X 15 X 20       Squats        Step-Ups F/L        Step Down F/L        Heel-toe raises       X 15 X 20       SLR F/L/R       X 15 X 15       Butt kicks  X 15      F/L Lunges                Kickboard Ex.  Small Blue Small Blue       Iso Abd.        Push-pull       X 15 X 15      Paddling  X 15              UE Format:  Open paddles       Horiz Abd/Add        IR/ER (wipers)        Alt Flex/Ext        Alt Press Down        Abd/Add        Shoulder rolls  X 20       UT stretch   10\" x 5       Deep Water:        Hang        Cycling        Jacks        X-Country

## 2024-07-11 ENCOUNTER — HOSPITAL ENCOUNTER (OUTPATIENT)
Age: 54
Setting detail: THERAPIES SERIES
Discharge: HOME | End: 2024-07-11
Payer: COMMERCIAL

## 2024-07-11 PROCEDURE — 97113 AQUATIC THERAPY/EXERCISES: CPT

## 2024-07-11 NOTE — FLOWSHEET NOTE
[][facility]  [address]  [place] [address]  [phone]  F: [phone] [x] [facility]  Outpatient Rehabilitation &  Therapy  [address]   P: [phone]  F: [phone]     Date:  2024  Patient Name:  Kat Akbar    :  1970  MRN: [id number]  Physician: Dr. Karli Escobar                       Insurance: [facility] ( vs)  Medical Diagnosis: Multiple Sclerosis (HCC) G35)                           Rehab Codes: M62.81 , R26.89 , M54.2, R29.3, R20.2   Onset date: referral date 24                   Next Dr's appt.: 2024   Visit# / total visits:      Cancels/No Shows: 1/0    Subjective:      Pain:  [] Yes  [x] No Location: posterior left knee and bilateral c-spine and UT    Pain Rating: (0-10 scale) 2/10 left post knee, 1/10 c-spine and upper traps.  Pain altered Tx:  [x] No  [] Yes  Action:    Comments: Pt reports that she tolerated her previous PT session well, minimal pain or fatigue issues. Pt states that she is having a \"good day\" today in regard to sx.     Objective: speech deficit less pronounced today    Modalities:   Precautions: fall risk    Shallow water over edge   Date     24     Visit #        6  7 8     Walk F/L/R   4 laps F/R 2 laps F/L/R 2 laps F/L/R     Marching       X 15 X 20  x15     Squats   Lt. Blue step x15      Step-Ups F/L   Yellow step x15 B     Step Down F/L        Heel-toe raises       X 15 X 20  x20     SLR F/L/R       X 15 X 15  x20     Butt kicks  X 15      F/L Lunges                Kickboard Ex.  Small Blue Small Blue  Small blue     Iso Abd.   10x5\"     Push-pull       X 15 X 15 x15     Paddling  X 15      Trunk rotation   x10     UE Format:  Open paddles  Open paddles     Horiz Abd/Add   X5 (increased pain left shoulder)     IR/ER (wipers)   x15     Alt Flex/Ext   x15     Alt Press Down        Abd/Add   x15     Shoulder rolls  X 20  x15

## 2024-07-15 ENCOUNTER — HOSPITAL ENCOUNTER (OUTPATIENT)
Age: 54
Setting detail: THERAPIES SERIES
Discharge: HOME | End: 2024-07-15
Payer: COMMERCIAL

## 2024-07-15 PROCEDURE — 97113 AQUATIC THERAPY/EXERCISES: CPT

## 2024-07-15 NOTE — FLOWSHEET NOTE
[][facility]  [address]  [place] [address]  [phone]  F: [phone] [x] [facility]  Outpatient Rehabilitation &  Therapy  [address]   P: [phone]  F: [phone]     Date:  7/15/2024  Patient Name:  Kat Akbar    :  1970  MRN: [id number]  Physician: Dr. Karli Escobar                       Insurance: [facility] ( vs)  Medical Diagnosis: Multiple Sclerosis (HCC) G35)                           Rehab Codes: M62.81 , R26.89 , M54.2, R29.3, R20.2   Onset date: referral date 24                   Next 's appt.: 2024   Visit# / total visits:      Cancels/No Shows: 1/0    Subjective:      Pain:  [x] Yes  [] No Location: bilateral shoulders, R>L   Pain Rating: (0-10 scale) 1/10  Pain altered Tx:  [x] No  [] Yes  Action:    Comments: Pt reports that she became very sore after her last session, mainly in her bilateral UE, with the right UE pain>left UE. Rated pain 8/10 all weekend. Was sore all weekend, but woke up feeling much better with decreased pain.    Objective: speech deficit less pronounced today    Modalities:   Precautions: fall risk    Shallow water over edge   Date     7/1/24 7/9/24 7/11/24 7/15/24    Visit #        6  7 8 9    Walk F/L/R   4 laps F/R 2 laps F/L/R 2 laps F/L/R 2 laps F/L/R    Marching       X 15 X 20  x15 x15    Squats   Lt. Blue step x15  Lt. Blue step   x15    Step-Ups F/L   Yellow step x15 B     Step Down F/L        Heel-toe raises       X 15 X 20  x20 x20    SLR F/L/R       X 15 X 15  x20 x20    Butt kicks  X 15  x15    F/L Lunges                Kickboard Ex.  Small Blue Small Blue  Small blue Small blue    Iso Abd.   10x5\" 10x5\"    Push-pull       X 15 X 15 x15 x15    Paddling  X 15      Trunk rotation   x10 x10    UE Format:  Open paddles  Open paddles     Horiz Abd/Add   X5 (increased pain left shoulder)     IR/ER (wipers)   x15     Alt Flex/Ext   x15

## 2024-07-17 ENCOUNTER — HOSPITAL ENCOUNTER (OUTPATIENT)
Age: 54
Setting detail: THERAPIES SERIES
Discharge: HOME | End: 2024-07-17
Payer: COMMERCIAL

## 2024-07-17 NOTE — FLOWSHEET NOTE
[x] [facility]  Outpatient Rehabilitation &  Therapy  [address]  P: [phone]  F: [phone]                  Therapy Cancel/No Show note    Date: 2024  Patient: Kat Akbar  : 1970  MRN: [id number]    Cancels/No Shows to date:     For today's appointment patient:    [x]  Cancelled    [] Rescheduled appointment    [] No-show     Reason given by patient:    []  Patient ill    []  Conflicting appointment    [] No transportation      [] Conflict with work    [x] No reason given    [] Weather related    [] COVID-19    [] Other:      Comments:        [] Next appointment was confirmed    Electronically signed by: JOSEPH MARCUM PTA   fl

## 2024-07-24 ENCOUNTER — HOSPITAL ENCOUNTER (OUTPATIENT)
Age: 54
Setting detail: THERAPIES SERIES
Discharge: HOME | End: 2024-07-24
Payer: COMMERCIAL

## 2024-07-24 NOTE — FLOWSHEET NOTE
[x] [facility]  Outpatient Rehabilitation &  Therapy  [address]  P: [phone]  F: [phone]                  Therapy Cancel/No Show note    Date: 2024  Patient: Kat Akbar  : 1970  MRN: [id number]    Cancels/No Shows to date: 3/1    For today's appointment patient:    [x]  Cancelled    [] Rescheduled appointment    [] No-show     Reason given by patient:    []  Patient ill    []  Conflicting appointment    [] No transportation      [] Conflict with work    [x] No reason given    [] Weather related    [] COVID-19    [] Other:      Comments:        [] Next appointment was confirmed    Electronically signed by: JOSEPH MARCUM PTA   fl

## 2024-07-29 ENCOUNTER — HOSPITAL ENCOUNTER (OUTPATIENT)
Age: 54
Setting detail: THERAPIES SERIES
Discharge: HOME | End: 2024-07-29
Payer: COMMERCIAL

## 2024-07-29 NOTE — FLOWSHEET NOTE
[x] [facility]  Outpatient Rehabilitation &  Therapy  [address]  P: [phone]  F: [phone]                  Therapy Cancel/No Show note    Date: 2024  Patient: Kat Akbar  : 1970  MRN: [id number]    Cancels/No Shows to date: 3/1    For today's appointment patient:    [x]  Cancelled    [] Rescheduled appointment    [] No-show     Reason given by patient:    []  Patient ill    []  Conflicting appointment    [] No transportation      [] Conflict with work    [] No reason given    [] Weather related    [] COVID-19    [x] Other:      Comments:  Pt is out of town      [x] Next appointment was confirmed    Electronically signed by: JOSEPH MARCUM PTA   fl

## 2024-07-31 ENCOUNTER — HOSPITAL ENCOUNTER (OUTPATIENT)
Age: 54
Setting detail: THERAPIES SERIES
Discharge: HOME | End: 2024-07-31
Payer: COMMERCIAL

## 2024-07-31 PROCEDURE — 97113 AQUATIC THERAPY/EXERCISES: CPT

## 2024-07-31 NOTE — FLOWSHEET NOTE
[][facility]  [address]  [place] [address]  [phone]  F: [phone] [x] [facility]  Outpatient Rehabilitation &  Therapy  [address]   P: [phone]  F: [phone]     Date:  2024  Patient Name:  Kat Akbar    :  1970  MRN: [id number]  Physician: Dr. Karli Escobar                       Insurance: [facility] ( vs)  Medical Diagnosis: Multiple Sclerosis (HCC) G35)                           Rehab Codes: M62.81 , R26.89 , M54.2, R29.3, R20.2   Onset date: referral date 24                   Next 's appt.: 2024   Visit# / total visits: 10/12     Cancels/No Shows:     Subjective:      Pain:  [x] Yes  [] No Location: R shoulder, left LE   Pain Rating: (0-10 scale) 5/10  Pain altered Tx:  [x] No  [] Yes  Action:    Comments: Pt stated she has recovered from having a UTI last week and is fatigued today after coming back from out of town yesterday Feeling better today. Pt noted she has a new script for vestibular therapy.    Objective: speech deficit more pronounced today.    Modalities:   Precautions: fall risk    Shallow water over edge   Date     7/1/24 7/9/24 7/11/24 7/15/24 7/31/24   Visit #        6  7 8 9 10   Walk F/L/R   4 laps F/R 2 laps F/L/R 2 laps F/L/R 2 laps F/L/R 2 laps F/L/R   Marching       X 15 X 20  x15 x15 x15   Squats   Lt. Blue step x15  Lt. Blue step   x15 Lt. Blue step  x15   Step-Ups F/L   Yellow step x15 B     Step Down F/L        Heel-toe raises       X 15 X 20  x20 x20 x20   SLR F/L/R       X 15 X 15  x20 x20 x20   Butt kicks  X 15  x15    F/L Lunges                Kickboard Ex.  Small Blue Small Blue  Small blue Small blue Small blue   Iso Abd.   10x5\" 10x5\" 10x5\"   Push-pull       X 15 X 15 x15 x15 x15   Paddling  X 15      Trunk rotation   x10 x10 x10   UE Format:  Open paddles  Open paddles  Open paddles   Horiz Abd/Add   X5 (increased pain left shoulder)

## 2024-08-10 ENCOUNTER — HOSPITAL ENCOUNTER (EMERGENCY)
Facility: CLINIC | Age: 54
Discharge: HOME | End: 2024-08-10
Attending: EMERGENCY MEDICINE
Payer: COMMERCIAL

## 2024-08-10 VITALS
WEIGHT: 221 LBS | SYSTOLIC BLOOD PRESSURE: 131 MMHG | OXYGEN SATURATION: 100 % | HEART RATE: 91 BPM | DIASTOLIC BLOOD PRESSURE: 100 MMHG | TEMPERATURE: 97.5 F | RESPIRATION RATE: 16 BRPM | HEIGHT: 65 IN | BODY MASS INDEX: 36.82 KG/M2

## 2024-08-10 DIAGNOSIS — R35.0 URINARY FREQUENCY: Primary | ICD-10-CM

## 2024-08-10 LAB
BACTERIA URNS QL MICRO: ABNORMAL
BILIRUB UR QL STRIP: NEGATIVE
CHARACTER UR: ABNORMAL
CLARITY UR: ABNORMAL
COLOR UR: YELLOW
CRYSTALS URNS MICRO: ABNORMAL /HPF
EPI CELLS #/AREA URNS HPF: ABNORMAL /HPF (ref 0–5)
GLUCOSE UR STRIP-MCNC: NEGATIVE MG/DL
HGB UR QL STRIP.AUTO: NEGATIVE
KETONES UR STRIP-MCNC: ABNORMAL MG/DL
LEUKOCYTE ESTERASE UR QL STRIP: NEGATIVE
MUCOUS THREADS URNS QL MICRO: ABNORMAL
NITRITE UR QL STRIP: NEGATIVE
PH UR STRIP: 6.5 [PH] (ref 5–8)
PROT UR STRIP-MCNC: ABNORMAL MG/DL
RBC #/AREA URNS HPF: ABNORMAL /HPF (ref 0–2)
SP GR UR STRIP: 1.03 (ref 1–1.03)
UROBILINOGEN UR STRIP-ACNC: NORMAL EU/DL (ref 0–1)
WBC #/AREA URNS HPF: ABNORMAL /HPF (ref 0–5)

## 2024-08-10 PROCEDURE — 81001 URINALYSIS AUTO W/SCOPE: CPT

## 2024-08-10 PROCEDURE — 99283 EMERGENCY DEPT VISIT LOW MDM: CPT

## 2024-08-10 ASSESSMENT — PAIN SCALES - GENERAL: PAINLEVEL_OUTOF10: 2

## 2024-08-10 ASSESSMENT — PAIN DESCRIPTION - ORIENTATION: ORIENTATION: LEFT;RIGHT;LOWER

## 2024-08-10 ASSESSMENT — PAIN DESCRIPTION - PAIN TYPE: TYPE: ACUTE PAIN

## 2024-08-10 ASSESSMENT — PAIN DESCRIPTION - LOCATION: LOCATION: BACK

## 2024-08-10 ASSESSMENT — PAIN DESCRIPTION - ONSET: ONSET: GRADUAL

## 2024-08-10 ASSESSMENT — PAIN DESCRIPTION - DESCRIPTORS: DESCRIPTORS: BURNING

## 2024-08-10 ASSESSMENT — PAIN DESCRIPTION - FREQUENCY: FREQUENCY: CONTINUOUS

## 2024-08-10 NOTE — ED PROVIDER NOTES
[facility]    [address] [address]  Phone: [phone]  Emergency Department  Faculty Attestation    I performed a history and physical examination of the patient and discussed management with the mid level provider. I reviewed the mid level provider's note and agree with the documented findings and plan of care. Any areas of disagreement are noted on the chart. I was personally present for the key portions of any procedures. I have documented in the chart those procedures where I was not present during the key portions. I have reviewed the emergency nurses triage note. I agree with the chief complaint, past medical history, past surgical history, allergies, medications, social and family history as documented unless otherwise noted below. Documentation of the HPI, Physical Exam and Medical Decision Making performed by medical students or scribes is based on my personal performance of the HPI, PE and MDM. For Physician Assistant/ Nurse Practitioner cases/documentation I have personally evaluated this patient and have completed at least one if not all key elements of the E/M (history, physical exam, and MDM). Additional findings are as noted.      Primary Care Physician:  Rolando Lopez MD    CHIEF COMPLAINT       Chief Complaint   Patient presents with    Frequent/Recurrent UTI       RECENT VITALS:   Temp: 97.5 °F (36.4 °C),  Pulse: 91, Respirations: 16, BP: (!) 131/100    LABS:  Labs Reviewed   URINALYSIS WITH REFLEX TO CULTURE - Abnormal; Notable for the following components:       Result Value    Turbidity UA SLIGHTLY CLOUDY (*)     Ketones, Urine TRACE (*)     Protein, UA TRACE (*)     All other components within normal limits   MICROSCOPIC URINALYSIS - Abnormal; Notable for the following components:    Crystals, UA FEW CALCIUM OXALATE (*)     Bacteria, UA FEW (*)     Other Observations UA   (*)     Value: Utilizing a urinalysis as the only screening method to exclude a potential 
MEDICATIONS       Discharge Medication List as of 8/10/2024  3:08 PM        CONTINUE these medications which have NOT CHANGED    Details   pregabalin (LYRICA) 25 MG capsule Historical Med      cyclobenzaprine (FLEXERIL) 10 MG tablet Take one(1) tablet daily as needed.Historical Med      metFORMIN, OSM, (FORTAMET) 500 MG extended release tablet Take 1 tablet by mouthHistorical Med      LORazepam (ATIVAN) 1 MG tablet Historical Med      baclofen (LIORESAL) 10 MG tablet Take one(1) tablet daily at bedtime.Historical Med      gabapentin (NEURONTIN) 800 MG tablet Take 900 mg by mouth 3 times daily. 900 mg three times dailyHistorical Med      insulin aspart (NOVOLOG FLEXPEN) 100 UNIT/ML injection pen 2-10 units Subcutaneous a.c. HSHistorical Med      insulin lispro, 1 Unit Dial, (HUMALOG/ADMELOG) 100 UNIT/ML SOPN only with steroidsHistorical Med      Continuous Blood Gluc Sensor (FREESTYLE ALLIE 3 SENSOR) MISC Historical Med      IMITREX 25 MG tablet 1 tablet Orally Once a day for 9 days, DAWHistorical Med      trospium (SANCTURA) 60 MG CP24 extended release capsule Take 1 capsule by mouth every morning (before breakfast)Historical Med      DULoxetine HCl 40 MG CPEP Historical Med      Biotin 1 MG CAPS Take 1 tablet by mouth dailyHistorical Med      Semaglutide,0.25 or 0.5MG/DOS, (OZEMPIC, 0.25 OR 0.5 MG/DOSE,) 2 MG/1.5ML SOPN Inject into the skinHistorical Med      Multiple Vitamins-Minerals (HAIR SKIN AND NAILS FORMULA PO) Take 1 tablet by mouth dailyHistorical Med      citalopram (CELEXA) 40 MG tablet Take 40 mg by mouth dailyHistorical Med      omeprazole (PRILOSEC) 20 MG delayed release capsule Take 20 mg by mouth daily as needed Historical Med      metoprolol tartrate (LOPRESSOR) 25 MG tablet Take 25 mg by mouth daily Historical Med             ALLERGIES     Morphine sulfate [morphine], Meperidine hcl, and Sulfa antibiotics    FAMILY HISTORY           Problem Relation Age of Onset    No Known Problems Mother     No

## 2024-08-14 ENCOUNTER — HOSPITAL ENCOUNTER (OUTPATIENT)
Age: 54
Setting detail: THERAPIES SERIES
Discharge: HOME | End: 2024-08-14
Payer: COMMERCIAL

## 2024-08-14 PROCEDURE — 97164 PT RE-EVAL EST PLAN CARE: CPT

## 2024-08-14 NOTE — CONSULTS
Walk in shower    []  Grab bars   Washing machine is on [x]  Main level   [] Second level   [] Basement     ADL/IADL Previous level of function: Independent in all Current level of function Who currently assists the patient with task   Bathing  [] Independent  [] Assist [x] Independent  [] Assist    Dress/grooming [] Independent  [] Assist [x] Independent  [] Assist    Transfer/mobility [] Independent  [] Assist [x] Independent  [] Assist    Feeding [] Independent  [] Assist [x] Independent  [] Assist    Toileting [] Independent  [] Assist [x] Independent  [] Assist    Driving [] Independent  [] Assist [] Independent  [x] Assist spouse   Housekeeping [] Independent  [] Assist [] Independent  [x] Assist    Grocery shop/meal prep [] Independent  [] Assist [] Independent  [x] Assist      Gait Prior level of function Current level of function    [x] Independent  [] Assist [x] Independent  [] Assist   Device: [x] Independent [] Independent    [] Straight Cane [] Quad cane [x] Straight Cane [] Quad cane    [] Standard walker [] Rolling walker   [] 4 wheeled walker [] Standard walker [] Rolling walker   [] 4 wheeled walker    [] Wheelchair [] Wheelchair     Subjective Measure Score Indications   DHI [Dizziness Handicap Inventory] 82/100    Tinetti 7/28    DGI[Dynamic Gait Index] deferred      OBSERVATION No Deficit Deficit Not Tested   Posture      Forward Head []  [x]  []    Rounded Shoulders [] [x] []   Kyphosis [] []inc.  [] dec. []   Lordosis [] [x]inc.  [] dec. []   Lateral Shift [] [] (R) [] (L) []       Gait Description: Ambulates with straight cane decreased B step length, decreased foot clearance, asymmetrical steps, discontinuity, path deviation, sway, wide base of support  B LE MMT:     Red flags & Special Tests:     Test Result   Vertebral Artery B (-)     Cervical spine AROM:    Motion Goniometric Measurement   Flexion WNL dizzy   Extension WNL dizzy + +   R, L rotation WNL dizzy L > R     Oculomotor Tests-

## 2024-08-23 ENCOUNTER — HOSPITAL ENCOUNTER (OUTPATIENT)
Age: 54
Setting detail: THERAPIES SERIES
Discharge: HOME | End: 2024-08-23
Payer: COMMERCIAL

## 2024-08-23 PROCEDURE — 97530 THERAPEUTIC ACTIVITIES: CPT

## 2024-08-23 PROCEDURE — 97116 GAIT TRAINING THERAPY: CPT

## 2024-08-23 PROCEDURE — 95992 CANALITH REPOSITIONING PROC: CPT

## 2024-08-23 NOTE — FLOWSHEET NOTE
facilitatesafe ambulation for household tasks  [x] 2. Independent with Home Exercise Program (HEP)     Patient Goals: Improve balance     Pt. Education:  [] Yes  [] No  [] Reviewed Prior HEP/Ed  Method of Education: [] Verbal  [] Demo  [] Written  Comprehension of Education:  [] Verbalizes understanding.  [] Demonstrates understanding.  [] Needs review.  [] Demonstrates/verbalizes HEP/Ed previously given.   Maintain head elevated 30 degrees, avoid L sidelying  Plan: [x] Continue current frequency toward long and short term goals.    [x] Specific Instructions for subsequent treatments: Continue to assess and treat vestibular dysfunction      Time In:1630            Time Out: 1715    Electronically signed by:  Lazara Ferreira, PT

## 2024-08-26 ENCOUNTER — HOSPITAL ENCOUNTER (OUTPATIENT)
Age: 54
Setting detail: THERAPIES SERIES
Discharge: HOME | End: 2024-08-26
Payer: COMMERCIAL

## 2024-08-26 PROCEDURE — 97112 NEUROMUSCULAR REEDUCATION: CPT

## 2024-08-26 PROCEDURE — 95992 CANALITH REPOSITIONING PROC: CPT

## 2024-08-26 PROCEDURE — 97116 GAIT TRAINING THERAPY: CPT

## 2024-08-26 NOTE — FLOWSHEET NOTE
[] [facility]  Outpatient Rehabilitation &  Therapy  [address]  [phone]  [phone] [] [facility]  Outpatient Rehabilitation &  Therapy  [address]  P: [phone]  F: [phone] [] [facility]  Outpatient Rehabilitation &  Therapy  [address]  P: [phone]  F: [phone] [] [facility]  Outpatient Rehabilitation &  Therapy  [address]  [phone]  [phone] [] [facility]  Outpatient Rehabilitation &  Therapy  [address]   P: [phone]  F: [phone]  [x] [facility]  Outpatient Rehabilitation &  Therapy  [address]  P: [phone]  F: [phone] [] [facility]  Outpatient Rehabilitation &  Therapy  [address]  P: [phone]  F: [phone] [] [facility]  Outpatient Rehabilitation &  Therapy  [address]  P: [phone]  F: [phone] [] [facility]  Outpatient Rehabilitation &  Therapy  [address]  P: [phone]  F: [phone]  [] [facility] Outpatient Rehabilitation &  Therapy  [address]  P: [phone]  F: [phone]     Physical Therapy Daily Treatment Note    Date:  2024  Patient Name:  Kat Akbar    :  1970  MRN: [id number]  Physician: Jeanne Jones APRN-CNP                                 Insurance: [facility]: 25 visits: 11 used  Medical Diagnosis: R42 vertigo, G35                      Rehab Codes: H81.10, R42, G35, R26.89  Onset date: 10/17/2023                     Next 's appt.: TBD  Visit# / total visits: 3/24 - 12;      Cancels/No Shows: 0/0    Subjective: Reports vertigo improved shorter duration. Went to grocery by herself and felt lost. Sleeping on R side was less dizzy on waking  Pain:  [x] Yes  [] No Location: L leg

## 2024-08-29 ENCOUNTER — HOSPITAL ENCOUNTER (OUTPATIENT)
Age: 54
Setting detail: THERAPIES SERIES
Discharge: HOME | End: 2024-08-29
Payer: COMMERCIAL

## 2024-08-29 NOTE — FLOWSHEET NOTE
[] [facility]  Outpatient Rehabilitation &  Therapy  [address]  [phone]  [phone] [] [facility]  Outpatient Rehabilitation &  Therapy  [address]  P: [phone]  F: [phone] [] [facility]  Outpatient Rehabilitation &  Therapy  [address]  P: [phone]  F: [phone] [] [facility]  Outpatient Rehabilitation &  Therapy  [address]  [phone]  [phone] [] [facility]  Outpatient Rehabilitation &  Therapy  [address]   P: [phone]  F: [phone]  [] [facility]  Outpatient Rehabilitation &  Therapy  [address]  P: [phone]  F: [phone] [] [facility]  Outpatient Rehabilitation &  Therapy  [address]  P: [phone]  F: [phone] [] [facility]  Outpatient Rehabilitation &  Therapy  [address]  P: [phone]  F: [phone] [] [facility]  Outpatient Rehabilitation &  Therapy  [address]  P: [phone]  F: [phone]  [] [facility] Outpatient Rehabilitation &  Therapy  [address]  P: [phone]  F: [phone]     Therapy Cancel/No Show note    Date: 2024  Patient: Kat Akbar  : 1970  MRN: [id number]    Cancels/No Shows to date:     For today's appointment patient:    [x]  Cancelled    [] Rescheduled appointment    [] No-show     Reason given by patient:    []  Patient ill    []  Conflicting appointment    [] No transportation      [] Conflict with work    [] No reason given    [] Weather related    [] COVID-19    [x] Other:   Change in insurance carrier and patient is checking on coverage first.    Comments:        [] Next appointment was confirmed    Electronically signed by: VIDA BARONE PT

## (undated) DEVICE — HYPODERMIC SAFETY NEEDLE: Brand: MAGELLAN

## (undated) DEVICE — SUTURE STRATAFIX SPRL SZ 1 L14IN ABSRB VLT L48CM CTX 1/2 SXPD2B405

## (undated) DEVICE — SOLUTION IV IRRIG POUR BRL 0.9% SODIUM CHL 2F7124

## (undated) DEVICE — PRECISION THIN (9.0 X 0.38 X 31.0MM)

## (undated) DEVICE — STOCKINETTE,IMPERVIOUS,12X48,STERILE: Brand: MEDLINE

## (undated) DEVICE — GLOVE SURG SZ 85 CRM LTX FREE POLYISOPRENE POLYMER BEAD ANTI

## (undated) DEVICE — COVER,TABLE,HEAVY DUTY,50"X90",STRL: Brand: MEDLINE

## (undated) DEVICE — DRAPE,U/ SHT,SPLIT,PLAS,STERIL: Brand: MEDLINE

## (undated) DEVICE — OSCILLATING TIP SAW CARTRIDGE: Brand: PRECISION FALCON

## (undated) DEVICE — STERILE PATIENT PROTECTIVE PAD FOR IMP® KNEE POSITIONERS & COHESIVE WRAP (10 / CASE): Brand: DE MAYO KNEE POSITIONER®

## (undated) DEVICE — 450 ML BOTTLE OF 0.05% CHLORHEXIDINE GLUCONATE IN 99.95% STERILE WATER FOR IRRIGATION, USP AND APPLICATOR.: Brand: IRRISEPT ANTIMICROBIAL WOUND LAVAGE

## (undated) DEVICE — APPLICATOR MEDICATED 26 CC SOLUTION HI LT ORNG CHLORAPREP

## (undated) DEVICE — SUTURE VCRL SZ 0 L36IN ABSRB UD L36MM CT-1 1/2 CIR J946H

## (undated) DEVICE — CEMENT MIXING SYSTEM WITH FEMORAL BREAKWAY NOZZLE: Brand: REVOLUTION

## (undated) DEVICE — SOLUTION IRRIG 3000ML 0.9% SOD CHL USP UROMATIC PLAS CONT

## (undated) DEVICE — DRAPE IRRIG FLD WRM W44XL44IN W/ AORN STD PRTBL INTRATEMP

## (undated) DEVICE — Device

## (undated) DEVICE — SUTURE STRATAFIX SPRL SZ 3-0 L5IN ABSRB UD FS L26MM 3/8 CIR SXMP2B411

## (undated) DEVICE — SUTURE VCRL SZ 1 L36IN ABSRB UD L36MM CT-1 1/2 CIR J947H

## (undated) DEVICE — SUTURE STRATAFIX SPRL SZ 2-0 L9IN ABSRB VLT MH L36MM 1/2 SXPD2B408

## (undated) DEVICE — DRAPE,REIN 53X77,STERILE: Brand: MEDLINE

## (undated) DEVICE — BLADE SAGITAL 18X90X1.27MM

## (undated) DEVICE — CONTAINER,SPECIMEN,OR STERILE,4OZ: Brand: MEDLINE

## (undated) DEVICE — ZIPPERED TOGA, 2X LARGE: Brand: FLYTE, SURGICOOL